# Patient Record
Sex: MALE | Race: WHITE | NOT HISPANIC OR LATINO | Employment: OTHER | URBAN - METROPOLITAN AREA
[De-identification: names, ages, dates, MRNs, and addresses within clinical notes are randomized per-mention and may not be internally consistent; named-entity substitution may affect disease eponyms.]

---

## 2021-12-23 ENCOUNTER — HOSPITAL ENCOUNTER (EMERGENCY)
Facility: CLINIC | Age: 81
Discharge: HOME | End: 2021-12-23
Attending: EMERGENCY MEDICINE | Admitting: EMERGENCY MEDICINE
Payer: COMMERCIAL

## 2021-12-23 ENCOUNTER — APPOINTMENT (OUTPATIENT)
Dept: CT IMAGING | Facility: CLINIC | Age: 81
End: 2021-12-23
Attending: EMERGENCY MEDICINE
Payer: COMMERCIAL

## 2021-12-23 ENCOUNTER — NURSE TRIAGE (OUTPATIENT)
Dept: FAMILY MEDICINE | Facility: CLINIC | Age: 81
End: 2021-12-23
Payer: COMMERCIAL

## 2021-12-23 VITALS
HEIGHT: 71 IN | DIASTOLIC BLOOD PRESSURE: 78 MMHG | TEMPERATURE: 98.7 F | HEART RATE: 57 BPM | BODY MASS INDEX: 28 KG/M2 | OXYGEN SATURATION: 99 % | SYSTOLIC BLOOD PRESSURE: 117 MMHG | RESPIRATION RATE: 15 BRPM | WEIGHT: 200 LBS

## 2021-12-23 DIAGNOSIS — T33.90XA FROSTBITE, INITIAL ENCOUNTER: ICD-10-CM

## 2021-12-23 PROCEDURE — 70450 CT HEAD/BRAIN W/O DYE: CPT

## 2021-12-23 PROCEDURE — 99284 EMERGENCY DEPT VISIT MOD MDM: CPT | Mod: 25

## 2021-12-23 RX ORDER — CEPHALEXIN 500 MG/1
500 CAPSULE ORAL 3 TIMES DAILY
Qty: 21 CAPSULE | Refills: 0 | Status: SHIPPED | OUTPATIENT
Start: 2021-12-23 | End: 2021-12-30

## 2021-12-23 RX ORDER — BACITRACIN ZINC 500 [USP'U]/G
OINTMENT TOPICAL
Qty: 30 G | Refills: 0 | Status: SHIPPED | OUTPATIENT
Start: 2021-12-23 | End: 2024-01-01

## 2021-12-23 ASSESSMENT — ENCOUNTER SYMPTOMS
DIAPHORESIS: 0
CHILLS: 0
HEADACHES: 0
COLOR CHANGE: 1
FEVER: 0
FACIAL SWELLING: 1

## 2021-12-23 ASSESSMENT — MIFFLIN-ST. JEOR: SCORE: 1634.32

## 2021-12-23 NOTE — TELEPHONE ENCOUNTER
Pt walked into the clinic c/o head injury     Fell onto hard floor at [employer] around 5am this morning  Iced head for a couple of hours     Denies any neuro symptoms   Takes a baby ASA daily     /78 97% HR 71    Large area of redness on forehead with some weeping of fluid, redness around his eye sockets. Per pt this is not normal for him     Advised pt go to ED. Pt agreeable to recommendation     Reason for Disposition    Age over 65 years with and area of head swelling or bruise    One or two 'black eyes' (bruising, purple color of eyelids), and onset within 24 hours of head injury    Additional Information    Negative: ACUTE NEUROLOGIC SYMPTOM and symptom present now    Negative: Knocked out (unconscious) > 1 minute    Negative: Seizure (convulsion) occurred (Exception: prior history of seizures and now alert and without Acute Neurologic Symptoms)    Negative: Neck pain after dangerous injury (e.g., MVA, diving, trampoline, contact sports, fall > 10 feet or 3 meters) (Exception: neck pain began > 1 hour after injury)    Negative: Major bleeding (actively dripping or spurting) that can't be stopped    Negative: Penetrating head injury (e.g., knife, gunshot wound, metal object)    Negative: Sounds like a life-threatening emergency to the triager    Negative: Recently examined and diagnosed with a concussion by a healthcare provider and has questions about concussion symptoms    Negative: Can't remember what happened (amnesia)    Negative: Vomiting once or more    Negative: Watery or blood-tinged fluid dripping from the nose or ears    Negative: ACUTE NEUROLOGIC SYMPTOM and now fine    Negative: Knocked out (unconscious) < 1 minute and now fine    Negative: Severe headache    Negative: Dangerous injury (e.g., MVA, diving, trampoline, contact sports, fall > 10 feet or 3 meters) or severe blow from hard object (e.g., golf club or baseball bat)    Negative: Large swelling or bruise > 2 inches (5 cm)    Negative:  Skin is split open or gaping (length > 1/2 inch or 12 mm)    Negative: Bleeding won't stop after 10 minutes of direct pressure (using correct technique)    Negative: Taking Coumadin (warfarin) or other strong blood thinner, or known bleeding disorder (e.g., thrombocytopenia)    Protocols used: HEAD INJURY-A-[place]

## 2021-12-23 NOTE — ED PROVIDER NOTES
"  History   Chief Complaint:  Fall     The history is provided by the patient and medical records.      Parviz Yip is a 81 year old male who is not anticoagulated with history of pericardial effusion and hypertension who presents with facial swelling and redness. The patient states he fell on Sunday, 4 days ago, at a [employer]. He states he was walking into the restaurant when he tripped and fell forward onto the ground. He does not remember hitting his face and denies loss of consciousness. Yesterday, he put some ice directly onto his forehead after he noticed some weeping on his forehead. He states he did not use a barrier to cover the ice, and placed ice directly onto his skin. He now has bilateral eye swelling, eye redness, and forehead redness. He denies any fevers, chills, diaphoresis, or headaches. He denies blood thinner use.     Review of Systems   Constitutional: Negative for chills, diaphoresis and fever.   HENT: Positive for facial swelling.    Eyes:        + swelling, redness   Skin: Positive for color change (face).   Neurological: Negative for syncope and headaches.   All other systems reviewed and are negative.    Allergies:  Amoxicillin  Lisinopril    Medications:  Metformin  Simvastatin  Diovan   ASA    Past Medical History:     Pericardial effusion  Dental abscess  Bladder stones  Prostatic hypertrophy  Hypertension    Diabetes mellitus     Past Surgical History:    Adenoidectomy  Tonsillectomy  Prostate surgery  Thoracotomy with pericardial window      Social History:  The patient was unaccompanied to the emergency department.    Physical Exam     Patient Vitals for the past 24 hrs:   BP Temp Temp src Pulse Resp SpO2 Height Weight   12/23/21 1012 (!) 145/71 -- -- 90 15 94 % -- --   12/23/21 1011 -- 98.7  F (37.1  C) Oral -- -- -- 1.803 m (5' 11\") 90.7 kg (200 lb)     Physical Exam  General: Resting comfortably on the gurney  Head:  The patient has erythema to the forehead including some " crusted over vesicles. There is no definite cellulitis. This has the appearance of cold induced injury. There is erythema to the infraorbital regions bilaterally consistent with 1st degree frostbite. The eyelids have edema noted bilaterally involving the upper and lower lids, again consistent with cold induced injury, to a lesser degree secondary to the ice packs mainly contacting the forehead and the infraorbital bones.   Eyes:  The pupils are equal, round, and reactive to light    There is no nystagmus    Extraocular muscles are intact    Conjunctivae and sclerae are normal  ENT:    The nose is normal    Pinnae are normal    The oropharynx is normal    Uvula is in the midline  Neck:  Normal range of motion    There is no rigidity noted    There is no midline cervical spine pain/tenderness    Trachea is in the midline    No mass is detected  CV:  Regular rate and underlying rhythm     Normal S1/S2, no S3/S4    No pathological murmur detected  Resp:  Lungs are clear    There is no tachypnea    Non-labored    No rales    No wheezing   Skin:  As noted above.  Neuro:  Speech is normal and fluent  Psych: Awake. Alert.      Normal affect.  Appropriate interactions.    Emergency Department Course   Imaging:  Head CT w/o contrast   Final Result   IMPRESSION:   1. Extracalvarial soft tissue swelling in the anterior forehead and   facial region. No evidence for any underlying fracture or any   intracranial hemorrhage.   2. Cerebral atrophy with associated white matter areas of decreased   attenuation. The latter most likely due to chronic small vessel   ischemic disease.       GO PATRICK MD            SYSTEM ID:  [id number]      Report per radiology    Emergency Department Course:  Reviewed:  I reviewed nursing notes and vitals    Assessments:  1034 I obtained history and examined the patient as noted above.   1155 I rechecked the patient and explained findings.     Disposition:  The patient was discharged to home.      Impression & Plan   Medical Decision Making:  Mr. 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  He suffered an injury to his face during a fall several days ago.  He started placing ice packs to the forehead, periorbital, and upper maxillary area to help with some mild discomfort.  He then noticed that he had weeping coming from the forehead and erythema to the forehead and to the maxilla.  He also noticed edema to his eyes.  He went to urgent care and they referred him to the emergency department.  The patient has obvious cold-induced injury to the skin including second-degree frostbite to the forehead with crusted over vesicles bilaterally, edema to the eyelids, and erythema without vesicles to the maxilla.  He was advised not to use ice to the face in the future.  He did not place a barrier between the ice and the skin.  There is no evidence of secondary cellulitis at this time.  The patient will be treated with topical antibiotic ointment to the forehead and an empiric antibiotic orally given that he is diabetic and there is a crusty appearance to the forehead vesicles.  There is no definite cellulitis or abscess.  This is all likely cold-induced injury.    Diagnosis:    ICD-10-CM    1. Frostbite, initial encounter  T33.90XA      Discharge Medications:  New Prescriptions    BACITRACIN 500 UNIT/GM EXTERNAL OINTMENT    Apply a thin amount to your forehead 3 times a day for the next 5 to 7 days.  Avoid getting this into your eyes.    CEPHALEXIN (KEFLEX) 500 MG CAPSULE    Take 1 capsule (500 mg) by mouth 3 times daily for 7 days     Scribe Disclosure:  MERRITT, Mae Pena, am serving as a scribe at 10:14 AM on 12/23/2021 to document services personally performed by Jabari De Jesus MD based on my observations and the provider's statements to me.     Jabari De Jesus MD  12/23/21 [id number]

## 2021-12-23 NOTE — ED TRIAGE NOTES
Fell on Sunday face forward in [employer] [address]. Patient reports hitting head. Denies LOC. Patient does not take blood thinners. Patient has bi lateral eye swelling, redness and redness and weeping to forehead.

## 2024-01-01 ENCOUNTER — PATIENT OUTREACH (OUTPATIENT)
Dept: CARE COORDINATION | Facility: CLINIC | Age: 84
End: 2024-01-01
Payer: COMMERCIAL

## 2024-01-01 ENCOUNTER — TELEPHONE (OUTPATIENT)
Dept: CARDIOLOGY | Facility: CLINIC | Age: 84
End: 2024-01-01
Payer: COMMERCIAL

## 2024-01-01 ENCOUNTER — APPOINTMENT (OUTPATIENT)
Dept: CT IMAGING | Facility: CLINIC | Age: 84
End: 2024-01-01
Attending: EMERGENCY MEDICINE
Payer: COMMERCIAL

## 2024-01-01 ENCOUNTER — LAB REQUISITION (OUTPATIENT)
Dept: LAB | Facility: CLINIC | Age: 84
End: 2024-01-01

## 2024-01-01 ENCOUNTER — LAB REQUISITION (OUTPATIENT)
Dept: LAB | Facility: CLINIC | Age: 84
End: 2024-01-01
Payer: OTHER MISCELLANEOUS

## 2024-01-01 ENCOUNTER — HOSPITAL ENCOUNTER (EMERGENCY)
Facility: CLINIC | Age: 84
Discharge: HOME | End: 2024-03-16
Attending: EMERGENCY MEDICINE | Admitting: EMERGENCY MEDICINE
Payer: COMMERCIAL

## 2024-01-01 ENCOUNTER — HOSPITAL ENCOUNTER (EMERGENCY)
Facility: CLINIC | Age: 84
Discharge: HOME | End: 2024-04-21
Attending: EMERGENCY MEDICINE | Admitting: EMERGENCY MEDICINE
Payer: COMMERCIAL

## 2024-01-01 ENCOUNTER — APPOINTMENT (OUTPATIENT)
Dept: OCCUPATIONAL THERAPY | Facility: CLINIC | Age: 84
DRG: 565 | End: 2024-01-01
Payer: COMMERCIAL

## 2024-01-01 ENCOUNTER — APPOINTMENT (OUTPATIENT)
Dept: PHYSICAL THERAPY | Facility: CLINIC | Age: 84
DRG: 565 | End: 2024-01-01
Payer: COMMERCIAL

## 2024-01-01 ENCOUNTER — APPOINTMENT (OUTPATIENT)
Dept: GENERAL RADIOLOGY | Facility: CLINIC | Age: 84
DRG: 565 | End: 2024-01-01
Attending: EMERGENCY MEDICINE
Payer: COMMERCIAL

## 2024-01-01 ENCOUNTER — APPOINTMENT (OUTPATIENT)
Dept: CT IMAGING | Facility: CLINIC | Age: 84
DRG: 565 | End: 2024-01-01
Attending: EMERGENCY MEDICINE
Payer: COMMERCIAL

## 2024-01-01 ENCOUNTER — ALLIED HEALTH/NURSE VISIT (OUTPATIENT)
Dept: UROLOGY | Facility: CLINIC | Age: 84
End: 2024-01-01
Payer: COMMERCIAL

## 2024-01-01 ENCOUNTER — TRANSITIONAL CARE UNIT VISIT (OUTPATIENT)
Dept: GERIATRICS | Facility: CLINIC | Age: 84
End: 2024-01-01
Payer: COMMERCIAL

## 2024-01-01 ENCOUNTER — HOSPITAL ENCOUNTER (EMERGENCY)
Facility: CLINIC | Age: 84
Discharge: NURSING, REHAB OR LONG-TERM CARE | End: 2024-07-20
Attending: EMERGENCY MEDICINE | Admitting: EMERGENCY MEDICINE
Payer: COMMERCIAL

## 2024-01-01 ENCOUNTER — TELEPHONE (OUTPATIENT)
Dept: NURSING | Facility: CLINIC | Age: 84
End: 2024-01-01
Payer: COMMERCIAL

## 2024-01-01 ENCOUNTER — TELEPHONE (OUTPATIENT)
Dept: UROLOGY | Facility: CLINIC | Age: 84
End: 2024-01-01
Payer: COMMERCIAL

## 2024-01-01 ENCOUNTER — MEDICAL CORRESPONDENCE (OUTPATIENT)
Dept: HEALTH INFORMATION MANAGEMENT | Facility: CLINIC | Age: 84
End: 2024-01-01

## 2024-01-01 ENCOUNTER — APPOINTMENT (OUTPATIENT)
Dept: CARDIOLOGY | Facility: CLINIC | Age: 84
DRG: 565 | End: 2024-01-01
Attending: STUDENT IN AN ORGANIZED HEALTH CARE EDUCATION/TRAINING PROGRAM
Payer: COMMERCIAL

## 2024-01-01 ENCOUNTER — DISCHARGE SUMMARY NURSING HOME (OUTPATIENT)
Dept: GERIATRICS | Facility: CLINIC | Age: 84
End: 2024-01-01
Payer: COMMERCIAL

## 2024-01-01 ENCOUNTER — APPOINTMENT (OUTPATIENT)
Dept: MRI IMAGING | Facility: CLINIC | Age: 84
DRG: 565 | End: 2024-01-01
Attending: EMERGENCY MEDICINE
Payer: COMMERCIAL

## 2024-01-01 ENCOUNTER — DOCUMENTATION ONLY (OUTPATIENT)
Dept: GERIATRICS | Facility: CLINIC | Age: 84
End: 2024-01-01

## 2024-01-01 ENCOUNTER — APPOINTMENT (OUTPATIENT)
Dept: OCCUPATIONAL THERAPY | Facility: CLINIC | Age: 84
DRG: 565 | End: 2024-01-01
Attending: STUDENT IN AN ORGANIZED HEALTH CARE EDUCATION/TRAINING PROGRAM
Payer: COMMERCIAL

## 2024-01-01 ENCOUNTER — HOSPITAL ENCOUNTER (INPATIENT)
Facility: CLINIC | Age: 84
LOS: 4 days | Discharge: NURSING, REHAB OR LONG-TERM CARE | DRG: 565 | End: 2024-01-23
Attending: EMERGENCY MEDICINE | Admitting: STUDENT IN AN ORGANIZED HEALTH CARE EDUCATION/TRAINING PROGRAM
Payer: COMMERCIAL

## 2024-01-01 ENCOUNTER — DOCUMENTATION ONLY (OUTPATIENT)
Dept: OTHER | Facility: CLINIC | Age: 84
End: 2024-01-01
Payer: COMMERCIAL

## 2024-01-01 ENCOUNTER — HOSPITAL ENCOUNTER (OUTPATIENT)
Dept: CARDIOLOGY | Facility: CLINIC | Age: 84
Discharge: HOME | End: 2024-02-01
Attending: INTERNAL MEDICINE | Admitting: INTERNAL MEDICINE
Payer: COMMERCIAL

## 2024-01-01 ENCOUNTER — APPOINTMENT (OUTPATIENT)
Dept: ULTRASOUND IMAGING | Facility: CLINIC | Age: 84
DRG: 565 | End: 2024-01-01
Attending: STUDENT IN AN ORGANIZED HEALTH CARE EDUCATION/TRAINING PROGRAM
Payer: COMMERCIAL

## 2024-01-01 ENCOUNTER — OFFICE VISIT (OUTPATIENT)
Dept: CARDIOLOGY | Facility: CLINIC | Age: 84
End: 2024-01-01
Attending: INTERNAL MEDICINE
Payer: COMMERCIAL

## 2024-01-01 ENCOUNTER — APPOINTMENT (OUTPATIENT)
Dept: PHYSICAL THERAPY | Facility: CLINIC | Age: 84
DRG: 565 | End: 2024-01-01
Attending: STUDENT IN AN ORGANIZED HEALTH CARE EDUCATION/TRAINING PROGRAM
Payer: COMMERCIAL

## 2024-01-01 ENCOUNTER — OFFICE VISIT (OUTPATIENT)
Dept: UROLOGY | Facility: CLINIC | Age: 84
End: 2024-01-01
Payer: COMMERCIAL

## 2024-01-01 VITALS
DIASTOLIC BLOOD PRESSURE: 62 MMHG | RESPIRATION RATE: 18 BRPM | SYSTOLIC BLOOD PRESSURE: 107 MMHG | TEMPERATURE: 97.8 F | BODY MASS INDEX: 24.3 KG/M2 | WEIGHT: 173.6 LBS | HEART RATE: 61 BPM | HEIGHT: 71 IN | OXYGEN SATURATION: 98 %

## 2024-01-01 VITALS
RESPIRATION RATE: 16 BRPM | TEMPERATURE: 97 F | SYSTOLIC BLOOD PRESSURE: 127 MMHG | OXYGEN SATURATION: 99 % | HEART RATE: 78 BPM | DIASTOLIC BLOOD PRESSURE: 88 MMHG

## 2024-01-01 VITALS
RESPIRATION RATE: 18 BRPM | DIASTOLIC BLOOD PRESSURE: 64 MMHG | HEIGHT: 71 IN | TEMPERATURE: 97.7 F | BODY MASS INDEX: 24.23 KG/M2 | HEART RATE: 69 BPM | SYSTOLIC BLOOD PRESSURE: 113 MMHG | OXYGEN SATURATION: 98 %

## 2024-01-01 VITALS
DIASTOLIC BLOOD PRESSURE: 56 MMHG | TEMPERATURE: 97.7 F | HEART RATE: 62 BPM | WEIGHT: 172.6 LBS | BODY MASS INDEX: 24.16 KG/M2 | HEIGHT: 71 IN | OXYGEN SATURATION: 96 % | SYSTOLIC BLOOD PRESSURE: 94 MMHG | RESPIRATION RATE: 18 BRPM

## 2024-01-01 VITALS
BODY MASS INDEX: 27.2 KG/M2 | WEIGHT: 190 LBS | DIASTOLIC BLOOD PRESSURE: 74 MMHG | HEIGHT: 70 IN | SYSTOLIC BLOOD PRESSURE: 127 MMHG | HEART RATE: 83 BPM | OXYGEN SATURATION: 97 %

## 2024-01-01 VITALS
RESPIRATION RATE: 20 BRPM | BODY MASS INDEX: 25.83 KG/M2 | SYSTOLIC BLOOD PRESSURE: 125 MMHG | TEMPERATURE: 97.2 F | OXYGEN SATURATION: 99 % | WEIGHT: 180 LBS | HEART RATE: 79 BPM | DIASTOLIC BLOOD PRESSURE: 82 MMHG

## 2024-01-01 VITALS
BODY MASS INDEX: 25.76 KG/M2 | WEIGHT: 179.5 LBS | SYSTOLIC BLOOD PRESSURE: 131 MMHG | OXYGEN SATURATION: 98 % | DIASTOLIC BLOOD PRESSURE: 76 MMHG | HEART RATE: 73 BPM

## 2024-01-01 VITALS
DIASTOLIC BLOOD PRESSURE: 69 MMHG | SYSTOLIC BLOOD PRESSURE: 116 MMHG | WEIGHT: 172.4 LBS | HEIGHT: 71 IN | OXYGEN SATURATION: 99 % | TEMPERATURE: 97.2 F | HEART RATE: 68 BPM | BODY MASS INDEX: 24.14 KG/M2 | RESPIRATION RATE: 16 BRPM

## 2024-01-01 VITALS
OXYGEN SATURATION: 96 % | BODY MASS INDEX: 24.32 KG/M2 | HEART RATE: 62 BPM | TEMPERATURE: 97.9 F | WEIGHT: 173.72 LBS | SYSTOLIC BLOOD PRESSURE: 97 MMHG | HEIGHT: 71 IN | RESPIRATION RATE: 18 BRPM | DIASTOLIC BLOOD PRESSURE: 57 MMHG

## 2024-01-01 VITALS
DIASTOLIC BLOOD PRESSURE: 74 MMHG | HEART RATE: 72 BPM | TEMPERATURE: 98.2 F | RESPIRATION RATE: 16 BRPM | OXYGEN SATURATION: 98 % | SYSTOLIC BLOOD PRESSURE: 136 MMHG

## 2024-01-01 DIAGNOSIS — T79.6XXA TRAUMATIC RHABDOMYOLYSIS, INITIAL ENCOUNTER (H): Primary | ICD-10-CM

## 2024-01-01 DIAGNOSIS — R55 SYNCOPE, UNSPECIFIED SYNCOPE TYPE: Primary | ICD-10-CM

## 2024-01-01 DIAGNOSIS — Z46.6 URINARY CATHETER (FOLEY) CHANGE REQUIRED: ICD-10-CM

## 2024-01-01 DIAGNOSIS — R29.6 FALLS FREQUENTLY: ICD-10-CM

## 2024-01-01 DIAGNOSIS — I10 ESSENTIAL HYPERTENSION: ICD-10-CM

## 2024-01-01 DIAGNOSIS — N39.0 URINARY TRACT INFECTION ASSOCIATED WITH INDWELLING URETHRAL CATHETER, INITIAL ENCOUNTER (H): ICD-10-CM

## 2024-01-01 DIAGNOSIS — M62.82 RHABDOMYOLYSIS: ICD-10-CM

## 2024-01-01 DIAGNOSIS — E78.5 DYSLIPIDEMIA: ICD-10-CM

## 2024-01-01 DIAGNOSIS — D64.9 ANEMIA, UNSPECIFIED TYPE: ICD-10-CM

## 2024-01-01 DIAGNOSIS — N13.9 OBSTRUCTION TO URINARY OUTFLOW: ICD-10-CM

## 2024-01-01 DIAGNOSIS — Z74.09 IMPAIRED MOBILITY AND ACTIVITIES OF DAILY LIVING: ICD-10-CM

## 2024-01-01 DIAGNOSIS — J45.909 UNCOMPLICATED ASTHMA, UNSPECIFIED ASTHMA SEVERITY, UNSPECIFIED WHETHER PERSISTENT: ICD-10-CM

## 2024-01-01 DIAGNOSIS — Z11.1 ENCOUNTER FOR SCREENING FOR RESPIRATORY TUBERCULOSIS: ICD-10-CM

## 2024-01-01 DIAGNOSIS — R53.1 GENERALIZED WEAKNESS: ICD-10-CM

## 2024-01-01 DIAGNOSIS — S22.080A WEDGE COMPRESSION FRACTURE OF T11-T12 VERTEBRA, INITIAL ENCOUNTER FOR CLOSED FRACTURE (H): ICD-10-CM

## 2024-01-01 DIAGNOSIS — I48.91 ATRIAL FIBRILLATION, UNSPECIFIED TYPE (H): ICD-10-CM

## 2024-01-01 DIAGNOSIS — R31.9 HEMATURIA, UNSPECIFIED TYPE: ICD-10-CM

## 2024-01-01 DIAGNOSIS — Z79.2 PROPHYLACTIC ANTIBIOTIC: Primary | ICD-10-CM

## 2024-01-01 DIAGNOSIS — Z78.9 IMPAIRED MOBILITY AND ACTIVITIES OF DAILY LIVING: ICD-10-CM

## 2024-01-01 DIAGNOSIS — R29.6 UNWITNESSED FALL: Primary | ICD-10-CM

## 2024-01-01 DIAGNOSIS — M79.10 MYALGIA, UNSPECIFIED SITE: ICD-10-CM

## 2024-01-01 DIAGNOSIS — E78.5 HYPERLIPIDEMIA, UNSPECIFIED: ICD-10-CM

## 2024-01-01 DIAGNOSIS — R33.9 URINARY RETENTION: Primary | ICD-10-CM

## 2024-01-01 DIAGNOSIS — T79.6XXA TRAUMATIC RHABDOMYOLYSIS, INITIAL ENCOUNTER (H): ICD-10-CM

## 2024-01-01 DIAGNOSIS — Z79.2 PROPHYLACTIC ANTIBIOTIC: ICD-10-CM

## 2024-01-01 DIAGNOSIS — Z97.8 CHRONIC INDWELLING FOLEY CATHETER: ICD-10-CM

## 2024-01-01 DIAGNOSIS — S22.080D COMPRESSION FRACTURE OF T12 VERTEBRA WITH ROUTINE HEALING, SUBSEQUENT ENCOUNTER: ICD-10-CM

## 2024-01-01 DIAGNOSIS — R33.8 BENIGN PROSTATIC HYPERPLASIA WITH URINARY RETENTION: ICD-10-CM

## 2024-01-01 DIAGNOSIS — E27.9 ADRENAL NODULE (H): ICD-10-CM

## 2024-01-01 DIAGNOSIS — I10 BENIGN ESSENTIAL HYPERTENSION: ICD-10-CM

## 2024-01-01 DIAGNOSIS — D64.9 ACUTE ANEMIA: ICD-10-CM

## 2024-01-01 DIAGNOSIS — D64.9 ANEMIA, UNSPECIFIED: ICD-10-CM

## 2024-01-01 DIAGNOSIS — N13.9 OBSTRUCTION TO URINARY OUTFLOW: Primary | ICD-10-CM

## 2024-01-01 DIAGNOSIS — R55 SYNCOPE, UNSPECIFIED SYNCOPE TYPE: ICD-10-CM

## 2024-01-01 DIAGNOSIS — R33.9 URINARY RETENTION: ICD-10-CM

## 2024-01-01 DIAGNOSIS — R53.81 PHYSICAL DECONDITIONING: ICD-10-CM

## 2024-01-01 DIAGNOSIS — N40.1 BENIGN PROSTATIC HYPERPLASIA WITH URINARY RETENTION: ICD-10-CM

## 2024-01-01 DIAGNOSIS — E11.9 TYPE 2 DIABETES MELLITUS WITHOUT COMPLICATION, WITHOUT LONG-TERM CURRENT USE OF INSULIN (H): ICD-10-CM

## 2024-01-01 DIAGNOSIS — Z86.59 HISTORY OF DEMENTIA: ICD-10-CM

## 2024-01-01 DIAGNOSIS — R31.0 GROSS HEMATURIA: ICD-10-CM

## 2024-01-01 DIAGNOSIS — E83.42 HYPOMAGNESEMIA: ICD-10-CM

## 2024-01-01 DIAGNOSIS — T79.6XXD TRAUMATIC RHABDOMYOLYSIS, SUBSEQUENT ENCOUNTER: ICD-10-CM

## 2024-01-01 DIAGNOSIS — G11.9 HEREDITARY ATAXIA, UNSPECIFIED (H): ICD-10-CM

## 2024-01-01 DIAGNOSIS — I48.91 ATRIAL FIBRILLATION, UNSPECIFIED TYPE (H): Primary | ICD-10-CM

## 2024-01-01 DIAGNOSIS — T83.511A URINARY TRACT INFECTION ASSOCIATED WITH INDWELLING URETHRAL CATHETER, INITIAL ENCOUNTER (H): ICD-10-CM

## 2024-01-01 DIAGNOSIS — E78.5 HYPERLIPIDEMIA LDL GOAL <100: ICD-10-CM

## 2024-01-01 DIAGNOSIS — E03.9 HYPOTHYROIDISM, UNSPECIFIED: ICD-10-CM

## 2024-01-01 LAB
ALBUMIN SERPL BCG-MCNC: 3.8 G/DL (ref 3.5–5.2)
ALBUMIN SERPL BCG-MCNC: 4.1 G/DL (ref 3.5–5.2)
ALBUMIN SERPL BCG-MCNC: 4.1 G/DL (ref 3.5–5.2)
ALBUMIN UR-MCNC: 20 MG/DL
ALBUMIN UR-MCNC: 70 MG/DL
ALBUMIN UR-MCNC: ABNORMAL G/DL
ALP SERPL-CCNC: 65 U/L (ref 40–150)
ALP SERPL-CCNC: 77 U/L (ref 40–150)
ALP SERPL-CCNC: 93 U/L (ref 40–150)
ALT SERPL W P-5'-P-CCNC: 13 U/L (ref 0–70)
ALT SERPL W P-5'-P-CCNC: 15 U/L (ref 0–70)
ALT SERPL W P-5'-P-CCNC: 16 U/L (ref 0–70)
ANION GAP SERPL CALCULATED.3IONS-SCNC: 10 MMOL/L (ref 7–15)
ANION GAP SERPL CALCULATED.3IONS-SCNC: 11 MMOL/L (ref 7–15)
ANION GAP SERPL CALCULATED.3IONS-SCNC: 11 MMOL/L (ref 7–15)
ANION GAP SERPL CALCULATED.3IONS-SCNC: 13 MMOL/L (ref 7–15)
ANION GAP SERPL CALCULATED.3IONS-SCNC: 6 MMOL/L (ref 7–15)
ANION GAP SERPL CALCULATED.3IONS-SCNC: 6 MMOL/L (ref 7–15)
ANION GAP SERPL CALCULATED.3IONS-SCNC: 7 MMOL/L (ref 7–15)
ANION GAP SERPL CALCULATED.3IONS-SCNC: 7 MMOL/L (ref 7–15)
APPEARANCE UR: ABNORMAL
AST SERPL W P-5'-P-CCNC: 19 U/L (ref 0–45)
AST SERPL W P-5'-P-CCNC: 31 U/L (ref 0–45)
AST SERPL W P-5'-P-CCNC: 33 U/L (ref 0–45)
ATRIAL RATE - MUSE: 227 BPM
ATRIAL RATE - MUSE: 68 BPM
ATRIAL RATE - MUSE: 92 BPM
ATRIAL RATE - MUSE: NORMAL BPM
BACTERIA #/AREA URNS HPF: ABNORMAL /HPF
BACTERIA UR CULT: ABNORMAL
BACTERIA UR CULT: NORMAL
BASOPHILS # BLD AUTO: 0 10E3/UL (ref 0–0.2)
BASOPHILS # BLD AUTO: 0.1 10E3/UL (ref 0–0.2)
BASOPHILS # BLD AUTO: 0.1 10E3/UL (ref 0–0.2)
BASOPHILS NFR BLD AUTO: 0 %
BASOPHILS NFR BLD AUTO: 1 %
BASOPHILS NFR BLD AUTO: 2 %
BILIRUB SERPL-MCNC: 0.2 MG/DL
BILIRUB SERPL-MCNC: 0.5 MG/DL
BILIRUB SERPL-MCNC: 0.6 MG/DL
BILIRUB UR QL STRIP: ABNORMAL
BILIRUB UR QL STRIP: NEGATIVE
BILIRUB UR QL STRIP: NEGATIVE
BUN SERPL-MCNC: 20.1 MG/DL (ref 8–23)
BUN SERPL-MCNC: 22.6 MG/DL (ref 8–23)
BUN SERPL-MCNC: 24.1 MG/DL (ref 8–23)
BUN SERPL-MCNC: 24.8 MG/DL (ref 8–23)
BUN SERPL-MCNC: 25.2 MG/DL (ref 8–23)
BUN SERPL-MCNC: 25.6 MG/DL (ref 8–23)
BUN SERPL-MCNC: 25.9 MG/DL (ref 8–23)
BUN SERPL-MCNC: 35.5 MG/DL (ref 8–23)
CALCIUM SERPL-MCNC: 8.1 MG/DL (ref 8.8–10.2)
CALCIUM SERPL-MCNC: 8.2 MG/DL (ref 8.8–10.2)
CALCIUM SERPL-MCNC: 8.4 MG/DL (ref 8.8–10.2)
CALCIUM SERPL-MCNC: 8.9 MG/DL (ref 8.8–10.2)
CALCIUM SERPL-MCNC: 9.3 MG/DL (ref 8.8–10.2)
CALCIUM SERPL-MCNC: 9.3 MG/DL (ref 8.8–10.4)
CALCIUM SERPL-MCNC: 9.3 MG/DL (ref 8.8–10.4)
CALCIUM SERPL-MCNC: 9.5 MG/DL (ref 8.8–10.2)
CHLORIDE SERPL-SCNC: 100 MMOL/L (ref 98–107)
CHLORIDE SERPL-SCNC: 103 MMOL/L (ref 98–107)
CHLORIDE SERPL-SCNC: 103 MMOL/L (ref 98–107)
CHLORIDE SERPL-SCNC: 104 MMOL/L (ref 98–107)
CHLORIDE SERPL-SCNC: 104 MMOL/L (ref 98–107)
CHLORIDE SERPL-SCNC: 106 MMOL/L (ref 98–107)
CHLORIDE SERPL-SCNC: 106 MMOL/L (ref 98–107)
CHLORIDE SERPL-SCNC: 109 MMOL/L (ref 98–107)
CHOLEST SERPL-MCNC: 136 MG/DL
CK SERPL-CCNC: 109 U/L (ref 39–308)
CK SERPL-CCNC: 379 U/L (ref 39–308)
CK SERPL-CCNC: 629 U/L (ref 39–308)
CK SERPL-CCNC: 708 U/L (ref 39–308)
CK SERPL-CCNC: 755 U/L (ref 39–308)
COLOR UR AUTO: ABNORMAL
COLOR UR AUTO: ABNORMAL
COLOR UR AUTO: YELLOW
CREAT SERPL-MCNC: 0.91 MG/DL (ref 0.67–1.17)
CREAT SERPL-MCNC: 0.93 MG/DL (ref 0.67–1.17)
CREAT SERPL-MCNC: 0.96 MG/DL (ref 0.67–1.17)
CREAT SERPL-MCNC: 0.97 MG/DL (ref 0.67–1.17)
CREAT SERPL-MCNC: 0.98 MG/DL (ref 0.67–1.17)
CREAT SERPL-MCNC: 1.02 MG/DL (ref 0.67–1.17)
CREAT SERPL-MCNC: 1.14 MG/DL (ref 0.67–1.17)
CREAT SERPL-MCNC: 1.23 MG/DL (ref 0.67–1.17)
DEPRECATED HCO3 PLAS-SCNC: 24 MMOL/L (ref 22–29)
DEPRECATED HCO3 PLAS-SCNC: 26 MMOL/L (ref 22–29)
DEPRECATED HCO3 PLAS-SCNC: 27 MMOL/L (ref 22–29)
DEPRECATED HCO3 PLAS-SCNC: 28 MMOL/L (ref 22–29)
DIASTOLIC BLOOD PRESSURE - MUSE: NORMAL MMHG
EGFRCR SERPLBLD CKD-EPI 2021: 58 ML/MIN/1.73M2
EGFRCR SERPLBLD CKD-EPI 2021: 64 ML/MIN/1.73M2
EGFRCR SERPLBLD CKD-EPI 2021: 72 ML/MIN/1.73M2
EGFRCR SERPLBLD CKD-EPI 2021: 77 ML/MIN/1.73M2
EGFRCR SERPLBLD CKD-EPI 2021: 77 ML/MIN/1.73M2
EGFRCR SERPLBLD CKD-EPI 2021: 78 ML/MIN/1.73M2
EGFRCR SERPLBLD CKD-EPI 2021: 81 ML/MIN/1.73M2
EGFRCR SERPLBLD CKD-EPI 2021: 84 ML/MIN/1.73M2
EOSINOPHIL # BLD AUTO: 0 10E3/UL (ref 0–0.7)
EOSINOPHIL # BLD AUTO: 0.4 10E3/UL (ref 0–0.7)
EOSINOPHIL # BLD AUTO: 0.5 10E3/UL (ref 0–0.7)
EOSINOPHIL NFR BLD AUTO: 0 %
EOSINOPHIL NFR BLD AUTO: 5 %
EOSINOPHIL NFR BLD AUTO: 6 %
ERYTHROCYTE [DISTWIDTH] IN BLOOD BY AUTOMATED COUNT: 13.4 % (ref 10–15)
ERYTHROCYTE [DISTWIDTH] IN BLOOD BY AUTOMATED COUNT: 13.5 % (ref 10–15)
ERYTHROCYTE [DISTWIDTH] IN BLOOD BY AUTOMATED COUNT: 13.6 % (ref 10–15)
ERYTHROCYTE [DISTWIDTH] IN BLOOD BY AUTOMATED COUNT: 13.9 % (ref 10–15)
ERYTHROCYTE [DISTWIDTH] IN BLOOD BY AUTOMATED COUNT: 14.2 % (ref 10–15)
ERYTHROCYTE [DISTWIDTH] IN BLOOD BY AUTOMATED COUNT: 14.6 % (ref 10–15)
ERYTHROCYTE [DISTWIDTH] IN BLOOD BY AUTOMATED COUNT: 15.5 % (ref 10–15)
ERYTHROCYTE [DISTWIDTH] IN BLOOD BY AUTOMATED COUNT: 15.5 % (ref 10–15)
FASTING STATUS PATIENT QL REPORTED: NO
FLUAV RNA SPEC QL NAA+PROBE: NEGATIVE
FLUBV RNA RESP QL NAA+PROBE: NEGATIVE
GAMMA INTERFERON BACKGROUND BLD IA-ACNC: 0.05 IU/ML
GLUCOSE BLDC GLUCOMTR-MCNC: 100 MG/DL (ref 70–99)
GLUCOSE BLDC GLUCOMTR-MCNC: 101 MG/DL (ref 70–99)
GLUCOSE BLDC GLUCOMTR-MCNC: 103 MG/DL (ref 70–99)
GLUCOSE BLDC GLUCOMTR-MCNC: 109 MG/DL (ref 70–99)
GLUCOSE BLDC GLUCOMTR-MCNC: 109 MG/DL (ref 70–99)
GLUCOSE BLDC GLUCOMTR-MCNC: 110 MG/DL (ref 70–99)
GLUCOSE BLDC GLUCOMTR-MCNC: 113 MG/DL (ref 70–99)
GLUCOSE BLDC GLUCOMTR-MCNC: 115 MG/DL (ref 70–99)
GLUCOSE BLDC GLUCOMTR-MCNC: 119 MG/DL (ref 70–99)
GLUCOSE BLDC GLUCOMTR-MCNC: 131 MG/DL (ref 70–99)
GLUCOSE BLDC GLUCOMTR-MCNC: 134 MG/DL (ref 70–99)
GLUCOSE BLDC GLUCOMTR-MCNC: 93 MG/DL (ref 70–99)
GLUCOSE BLDC GLUCOMTR-MCNC: 93 MG/DL (ref 70–99)
GLUCOSE SERPL-MCNC: 101 MG/DL (ref 70–99)
GLUCOSE SERPL-MCNC: 106 MG/DL (ref 70–99)
GLUCOSE SERPL-MCNC: 108 MG/DL (ref 70–99)
GLUCOSE SERPL-MCNC: 112 MG/DL (ref 70–99)
GLUCOSE SERPL-MCNC: 125 MG/DL (ref 70–99)
GLUCOSE SERPL-MCNC: 126 MG/DL (ref 70–99)
GLUCOSE SERPL-MCNC: 85 MG/DL (ref 70–99)
GLUCOSE SERPL-MCNC: 98 MG/DL (ref 70–99)
GLUCOSE UR STRIP-MCNC: ABNORMAL MG/DL
GLUCOSE UR STRIP-MCNC: NEGATIVE MG/DL
GLUCOSE UR STRIP-MCNC: NEGATIVE MG/DL
HBA1C MFR BLD: 5.8 %
HBA1C MFR BLD: 5.9 %
HCO3 BLDV-SCNC: 26 MMOL/L (ref 21–28)
HCO3 SERPL-SCNC: 23 MMOL/L (ref 22–29)
HCO3 SERPL-SCNC: 27 MMOL/L (ref 22–29)
HCT VFR BLD AUTO: 29 % (ref 40–53)
HCT VFR BLD AUTO: 29 % (ref 40–53)
HCT VFR BLD AUTO: 33 % (ref 40–53)
HCT VFR BLD AUTO: 35 % (ref 40–53)
HCT VFR BLD AUTO: 36 % (ref 40–53)
HCT VFR BLD AUTO: 37.8 % (ref 40–53)
HCT VFR BLD AUTO: 38.7 % (ref 40–53)
HCT VFR BLD AUTO: 42.9 % (ref 40–53)
HDLC SERPL-MCNC: 37 MG/DL
HGB BLD-MCNC: 10.9 G/DL (ref 13.3–17.7)
HGB BLD-MCNC: 11.1 G/DL (ref 13.3–17.7)
HGB BLD-MCNC: 11.5 G/DL (ref 13.3–17.7)
HGB BLD-MCNC: 11.9 G/DL (ref 13.3–17.7)
HGB BLD-MCNC: 12.4 G/DL (ref 13.3–17.7)
HGB BLD-MCNC: 13 G/DL (ref 13.3–17.7)
HGB BLD-MCNC: 13.4 G/DL (ref 13.3–17.7)
HGB BLD-MCNC: 9.7 G/DL (ref 13.3–17.7)
HGB BLD-MCNC: 9.8 G/DL (ref 13.3–17.7)
HGB UR QL STRIP: ABNORMAL
HOLD SPECIMEN: NORMAL
IMM GRANULOCYTES # BLD: 0 10E3/UL
IMM GRANULOCYTES NFR BLD: 0 %
IMM GRANULOCYTES NFR BLD: 0 %
IMM GRANULOCYTES NFR BLD: 1 %
INTERPRETATION ECG - MUSE: NORMAL
KETONES UR STRIP-MCNC: ABNORMAL MG/DL
KETONES UR STRIP-MCNC: NEGATIVE MG/DL
KETONES UR STRIP-MCNC: NEGATIVE MG/DL
LACTATE BLD-SCNC: 1.7 MMOL/L
LACTATE SERPL-SCNC: 2.4 MMOL/L (ref 0.7–2)
LDLC SERPL CALC-MCNC: 77 MG/DL
LEUKOCYTE ESTERASE UR QL STRIP: ABNORMAL
LEUKOCYTE ESTERASE UR QL STRIP: ABNORMAL
LEUKOCYTE ESTERASE UR QL STRIP: NEGATIVE
LVEF ECHO: NORMAL
LYMPHOCYTES # BLD AUTO: 0.3 10E3/UL (ref 0.8–5.3)
LYMPHOCYTES # BLD AUTO: 0.6 10E3/UL (ref 0.8–5.3)
LYMPHOCYTES # BLD AUTO: 1 10E3/UL (ref 0.8–5.3)
LYMPHOCYTES NFR BLD AUTO: 15 %
LYMPHOCYTES NFR BLD AUTO: 3 %
LYMPHOCYTES NFR BLD AUTO: 7 %
M TB IFN-G BLD-IMP: NEGATIVE
M TB IFN-G CD4+ BCKGRND COR BLD-ACNC: 9.95 IU/ML
MAGNESIUM SERPL-MCNC: 1.6 MG/DL (ref 1.7–2.3)
MAGNESIUM SERPL-MCNC: 1.7 MG/DL (ref 1.7–2.3)
MAGNESIUM SERPL-MCNC: 1.8 MG/DL (ref 1.7–2.3)
MAGNESIUM SERPL-MCNC: 2.1 MG/DL (ref 1.7–2.3)
MCH RBC QN AUTO: 28.8 PG (ref 26.5–33)
MCH RBC QN AUTO: 29.3 PG (ref 26.5–33)
MCH RBC QN AUTO: 29.5 PG (ref 26.5–33)
MCH RBC QN AUTO: 29.7 PG (ref 26.5–33)
MCH RBC QN AUTO: 29.8 PG (ref 26.5–33)
MCH RBC QN AUTO: 29.9 PG (ref 26.5–33)
MCH RBC QN AUTO: 30.2 PG (ref 26.5–33)
MCH RBC QN AUTO: 30.4 PG (ref 26.5–33)
MCHC RBC AUTO-ENTMCNC: 31.2 G/DL (ref 31.5–36.5)
MCHC RBC AUTO-ENTMCNC: 31.7 G/DL (ref 31.5–36.5)
MCHC RBC AUTO-ENTMCNC: 31.9 G/DL (ref 31.5–36.5)
MCHC RBC AUTO-ENTMCNC: 32.8 G/DL (ref 31.5–36.5)
MCHC RBC AUTO-ENTMCNC: 33 G/DL (ref 31.5–36.5)
MCHC RBC AUTO-ENTMCNC: 33.4 G/DL (ref 31.5–36.5)
MCHC RBC AUTO-ENTMCNC: 33.6 G/DL (ref 31.5–36.5)
MCHC RBC AUTO-ENTMCNC: 33.8 G/DL (ref 31.5–36.5)
MCV RBC AUTO: 90 FL (ref 78–100)
MCV RBC AUTO: 91 FL (ref 78–100)
MCV RBC AUTO: 92 FL (ref 78–100)
MCV RBC AUTO: 92 FL (ref 78–100)
MCV RBC AUTO: 93 FL (ref 78–100)
MITOGEN IGNF BCKGRD COR BLD-ACNC: 0 IU/ML
MITOGEN IGNF BCKGRD COR BLD-ACNC: 0.07 IU/ML
MONOCYTES # BLD AUTO: 0.7 10E3/UL (ref 0–1.3)
MONOCYTES # BLD AUTO: 0.8 10E3/UL (ref 0–1.3)
MONOCYTES # BLD AUTO: 0.8 10E3/UL (ref 0–1.3)
MONOCYTES NFR BLD AUTO: 12 %
MONOCYTES NFR BLD AUTO: 7 %
MONOCYTES NFR BLD AUTO: 9 %
MUCOUS THREADS #/AREA URNS LPF: PRESENT /LPF
MUCOUS THREADS #/AREA URNS LPF: PRESENT /LPF
NEUTROPHILS # BLD AUTO: 4.4 10E3/UL (ref 1.6–8.3)
NEUTROPHILS # BLD AUTO: 6.8 10E3/UL (ref 1.6–8.3)
NEUTROPHILS # BLD AUTO: 8.5 10E3/UL (ref 1.6–8.3)
NEUTROPHILS NFR BLD AUTO: 64 %
NEUTROPHILS NFR BLD AUTO: 79 %
NEUTROPHILS NFR BLD AUTO: 90 %
NITRATE UR QL: ABNORMAL
NITRATE UR QL: NEGATIVE
NITRATE UR QL: NEGATIVE
NONHDLC SERPL-MCNC: 99 MG/DL
NRBC # BLD AUTO: 0 10E3/UL
NRBC BLD AUTO-RTO: 0 /100
P AXIS - MUSE: 72 DEGREES
P AXIS - MUSE: NORMAL DEGREES
PCO2 BLDV: 43 MM HG (ref 40–50)
PH BLDV: 7.39 [PH] (ref 7.32–7.43)
PH UR STRIP: 5 [PH] (ref 5–7)
PH UR STRIP: 6 [PH] (ref 5–7)
PH UR STRIP: ABNORMAL [PH]
PHOSPHATE SERPL-MCNC: 2.9 MG/DL (ref 2.5–4.5)
PLATELET # BLD AUTO: 147 10E3/UL (ref 150–450)
PLATELET # BLD AUTO: 162 10E3/UL (ref 150–450)
PLATELET # BLD AUTO: 166 10E3/UL (ref 150–450)
PLATELET # BLD AUTO: 190 10E3/UL (ref 150–450)
PLATELET # BLD AUTO: 198 10E3/UL (ref 150–450)
PLATELET # BLD AUTO: 241 10E3/UL (ref 150–450)
PLATELET # BLD AUTO: 278 10E3/UL (ref 150–450)
PLATELET # BLD AUTO: 287 10E3/UL (ref 150–450)
PO2 BLDV: 30 MM HG (ref 25–47)
POTASSIUM SERPL-SCNC: 4.1 MMOL/L (ref 3.4–5.3)
POTASSIUM SERPL-SCNC: 4.3 MMOL/L (ref 3.4–5.3)
POTASSIUM SERPL-SCNC: 4.4 MMOL/L (ref 3.4–5.3)
POTASSIUM SERPL-SCNC: 4.4 MMOL/L (ref 3.4–5.3)
POTASSIUM SERPL-SCNC: 4.6 MMOL/L (ref 3.4–5.3)
POTASSIUM SERPL-SCNC: 4.9 MMOL/L (ref 3.4–5.3)
PR INTERVAL - MUSE: 112 MS
PR INTERVAL - MUSE: NORMAL MS
PROT SERPL-MCNC: 7.1 G/DL (ref 6.4–8.3)
PROT SERPL-MCNC: 7.3 G/DL (ref 6.4–8.3)
PROT SERPL-MCNC: 7.4 G/DL (ref 6.4–8.3)
QRS DURATION - MUSE: 100 MS
QRS DURATION - MUSE: 72 MS
QRS DURATION - MUSE: 78 MS
QRS DURATION - MUSE: 86 MS
QT - MUSE: 376 MS
QT - MUSE: 380 MS
QT - MUSE: 390 MS
QT - MUSE: 406 MS
QTC - MUSE: 388 MS
QTC - MUSE: 399 MS
QTC - MUSE: 414 MS
QTC - MUSE: 425 MS
QUANTIFERON MITOGEN: 10 IU/ML
QUANTIFERON NIL TUBE: 0.05 IU/ML
QUANTIFERON TB1 TUBE: 0.12 IU/ML
QUANTIFERON TB2 TUBE: 0.05
R AXIS - MUSE: -36 DEGREES
R AXIS - MUSE: 19 DEGREES
R AXIS - MUSE: 46 DEGREES
R AXIS - MUSE: 62 DEGREES
RBC # BLD AUTO: 3.22 10E6/UL (ref 4.4–5.9)
RBC # BLD AUTO: 3.24 10E6/UL (ref 4.4–5.9)
RBC # BLD AUTO: 3.67 10E6/UL (ref 4.4–5.9)
RBC # BLD AUTO: 3.76 10E6/UL (ref 4.4–5.9)
RBC # BLD AUTO: 3.92 10E6/UL (ref 4.4–5.9)
RBC # BLD AUTO: 4.16 10E6/UL (ref 4.4–5.9)
RBC # BLD AUTO: 4.31 10E6/UL (ref 4.4–5.9)
RBC # BLD AUTO: 4.66 10E6/UL (ref 4.4–5.9)
RBC URINE: >182 /HPF
RESIDUAL VOLUME (RV) (EXTERNAL): NORMAL
RSV RNA SPEC NAA+PROBE: NEGATIVE
SAO2 % BLDV: 57 % (ref 70–75)
SARS-COV-2 RNA RESP QL NAA+PROBE: NEGATIVE
SODIUM SERPL-SCNC: 136 MMOL/L (ref 135–145)
SODIUM SERPL-SCNC: 138 MMOL/L (ref 135–145)
SODIUM SERPL-SCNC: 138 MMOL/L (ref 135–145)
SODIUM SERPL-SCNC: 139 MMOL/L (ref 135–145)
SODIUM SERPL-SCNC: 140 MMOL/L (ref 135–145)
SODIUM SERPL-SCNC: 142 MMOL/L (ref 135–145)
SP GR UR STRIP: 1.02 (ref 1–1.03)
SP GR UR STRIP: 1.02 (ref 1–1.03)
SP GR UR STRIP: ABNORMAL
SQUAMOUS EPITHELIAL: 1 /HPF
SQUAMOUS EPITHELIAL: <1 /HPF
SYSTOLIC BLOOD PRESSURE - MUSE: NORMAL MMHG
T AXIS - MUSE: 1 DEGREES
T AXIS - MUSE: 28 DEGREES
T AXIS - MUSE: 43 DEGREES
T AXIS - MUSE: 57 DEGREES
T4 FREE SERPL-MCNC: 1.2 NG/DL (ref 0.9–1.7)
TRIGL SERPL-MCNC: 112 MG/DL
TROPONIN T SERPL HS-MCNC: 26 NG/L
TROPONIN T SERPL HS-MCNC: 29 NG/L
TROPONIN T SERPL HS-MCNC: 36 NG/L
TROPONIN T SERPL HS-MCNC: 39 NG/L
TROPONIN T SERPL HS-MCNC: 39 NG/L
TSH SERPL DL<=0.005 MIU/L-ACNC: 2.74 UIU/ML (ref 0.3–4.2)
TSH SERPL DL<=0.005 MIU/L-ACNC: 3.92 UIU/ML (ref 0.3–4.2)
UFH PPP CHRO-ACNC: 0.17 IU/ML
UFH PPP CHRO-ACNC: 0.21 IU/ML
UFH PPP CHRO-ACNC: 0.27 IU/ML
UFH PPP CHRO-ACNC: 0.35 IU/ML
UFH PPP CHRO-ACNC: 0.8 IU/ML
UROBILINOGEN UR STRIP-MCNC: 2 MG/DL
UROBILINOGEN UR STRIP-MCNC: ABNORMAL MG/DL
UROBILINOGEN UR STRIP-MCNC: NORMAL MG/DL
VENTRICULAR RATE- MUSE: 63 BPM
VENTRICULAR RATE- MUSE: 66 BPM
VENTRICULAR RATE- MUSE: 68 BPM
VENTRICULAR RATE- MUSE: 68 BPM
VIT B12 SERPL-MCNC: 574 PG/ML (ref 232–1245)
VIT D+METAB SERPL-MCNC: 31 NG/ML (ref 20–50)
WBC # BLD AUTO: 5.8 10E3/UL (ref 4–11)
WBC # BLD AUTO: 5.9 10E3/UL (ref 4–11)
WBC # BLD AUTO: 5.9 10E3/UL (ref 4–11)
WBC # BLD AUTO: 6.6 10E3/UL (ref 4–11)
WBC # BLD AUTO: 6.7 10E3/UL (ref 4–11)
WBC # BLD AUTO: 8.1 10E3/UL (ref 4–11)
WBC # BLD AUTO: 8.6 10E3/UL (ref 4–11)
WBC # BLD AUTO: 9.5 10E3/UL (ref 4–11)
WBC CLUMPS #/AREA URNS HPF: PRESENT /HPF
WBC URINE: 10 /HPF
WBC URINE: 9 /HPF
WBC URINE: >182 /HPF

## 2024-01-01 PROCEDURE — 36415 COLL VENOUS BLD VENIPUNCTURE: CPT | Performed by: STUDENT IN AN ORGANIZED HEALTH CARE EDUCATION/TRAINING PROGRAM

## 2024-01-01 PROCEDURE — 96361 HYDRATE IV INFUSION ADD-ON: CPT

## 2024-01-01 PROCEDURE — 250N000011 HC RX IP 250 OP 636: Performed by: INTERNAL MEDICINE

## 2024-01-01 PROCEDURE — 80061 LIPID PANEL: CPT | Mod: ORL | Performed by: FAMILY MEDICINE

## 2024-01-01 PROCEDURE — 96365 THER/PROPH/DIAG IV INF INIT: CPT | Mod: 59

## 2024-01-01 PROCEDURE — P9604 ONE-WAY ALLOW PRORATED TRIP: HCPCS | Mod: ORL | Performed by: FAMILY MEDICINE

## 2024-01-01 PROCEDURE — 80048 BASIC METABOLIC PNL TOTAL CA: CPT | Performed by: PHYSICIAN ASSISTANT

## 2024-01-01 PROCEDURE — 83735 ASSAY OF MAGNESIUM: CPT | Performed by: PHYSICIAN ASSISTANT

## 2024-01-01 PROCEDURE — 250N000011 HC RX IP 250 OP 636: Performed by: EMERGENCY MEDICINE

## 2024-01-01 PROCEDURE — 93306 TTE W/DOPPLER COMPLETE: CPT

## 2024-01-01 PROCEDURE — 99284 EMERGENCY DEPT VISIT MOD MDM: CPT | Mod: 25

## 2024-01-01 PROCEDURE — 97161 PT EVAL LOW COMPLEX 20 MIN: CPT | Mod: GP

## 2024-01-01 PROCEDURE — 80048 BASIC METABOLIC PNL TOTAL CA: CPT | Performed by: INTERNAL MEDICINE

## 2024-01-01 PROCEDURE — 93005 ELECTROCARDIOGRAM TRACING: CPT

## 2024-01-01 PROCEDURE — 258N000003 HC RX IP 258 OP 636: Performed by: INTERNAL MEDICINE

## 2024-01-01 PROCEDURE — 97116 GAIT TRAINING THERAPY: CPT | Mod: GP

## 2024-01-01 PROCEDURE — 99213 OFFICE O/P EST LOW 20 MIN: CPT | Mod: 25 | Performed by: STUDENT IN AN ORGANIZED HEALTH CARE EDUCATION/TRAINING PROGRAM

## 2024-01-01 PROCEDURE — 80053 COMPREHEN METABOLIC PANEL: CPT | Mod: ORL | Performed by: FAMILY MEDICINE

## 2024-01-01 PROCEDURE — 99309 SBSQ NF CARE MODERATE MDM 30: CPT | Performed by: PHYSICIAN ASSISTANT

## 2024-01-01 PROCEDURE — 51726 COMPLEX CYSTOMETROGRAM: CPT | Performed by: PHYSICIAN ASSISTANT

## 2024-01-01 PROCEDURE — 258N000003 HC RX IP 258 OP 636: Performed by: STUDENT IN AN ORGANIZED HEALTH CARE EDUCATION/TRAINING PROGRAM

## 2024-01-01 PROCEDURE — 81001 URINALYSIS AUTO W/SCOPE: CPT | Performed by: EMERGENCY MEDICINE

## 2024-01-01 PROCEDURE — 99232 SBSQ HOSP IP/OBS MODERATE 35: CPT | Performed by: INTERNAL MEDICINE

## 2024-01-01 PROCEDURE — 250N000013 HC RX MED GY IP 250 OP 250 PS 637: Performed by: STUDENT IN AN ORGANIZED HEALTH CARE EDUCATION/TRAINING PROGRAM

## 2024-01-01 PROCEDURE — 93010 ELECTROCARDIOGRAM REPORT: CPT | Mod: 76 | Performed by: INTERNAL MEDICINE

## 2024-01-01 PROCEDURE — 250N000013 HC RX MED GY IP 250 OP 250 PS 637: Performed by: INTERNAL MEDICINE

## 2024-01-01 PROCEDURE — 85018 HEMOGLOBIN: CPT | Performed by: EMERGENCY MEDICINE

## 2024-01-01 PROCEDURE — 84443 ASSAY THYROID STIM HORMONE: CPT | Performed by: STUDENT IN AN ORGANIZED HEALTH CARE EDUCATION/TRAINING PROGRAM

## 2024-01-01 PROCEDURE — 83036 HEMOGLOBIN GLYCOSYLATED A1C: CPT | Performed by: INTERNAL MEDICINE

## 2024-01-01 PROCEDURE — 51702 INSERT TEMP BLADDER CATH: CPT

## 2024-01-01 PROCEDURE — 97530 THERAPEUTIC ACTIVITIES: CPT | Mod: GO

## 2024-01-01 PROCEDURE — 97110 THERAPEUTIC EXERCISES: CPT | Mod: GP

## 2024-01-01 PROCEDURE — 82306 VITAMIN D 25 HYDROXY: CPT | Mod: ORL | Performed by: FAMILY MEDICINE

## 2024-01-01 PROCEDURE — 70450 CT HEAD/BRAIN W/O DYE: CPT

## 2024-01-01 PROCEDURE — 84439 ASSAY OF FREE THYROXINE: CPT | Mod: ORL | Performed by: FAMILY MEDICINE

## 2024-01-01 PROCEDURE — 99285 EMERGENCY DEPT VISIT HI MDM: CPT | Mod: 25

## 2024-01-01 PROCEDURE — 36415 COLL VENOUS BLD VENIPUNCTURE: CPT | Performed by: EMERGENCY MEDICINE

## 2024-01-01 PROCEDURE — 70496 CT ANGIOGRAPHY HEAD: CPT

## 2024-01-01 PROCEDURE — 82040 ASSAY OF SERUM ALBUMIN: CPT | Performed by: EMERGENCY MEDICINE

## 2024-01-01 PROCEDURE — 36415 COLL VENOUS BLD VENIPUNCTURE: CPT | Performed by: PHYSICIAN ASSISTANT

## 2024-01-01 PROCEDURE — 82803 BLOOD GASES ANY COMBINATION: CPT

## 2024-01-01 PROCEDURE — 258N000003 HC RX IP 258 OP 636: Performed by: EMERGENCY MEDICINE

## 2024-01-01 PROCEDURE — 85027 COMPLETE CBC AUTOMATED: CPT | Performed by: INTERNAL MEDICINE

## 2024-01-01 PROCEDURE — 250N000011 HC RX IP 250 OP 636: Performed by: STUDENT IN AN ORGANIZED HEALTH CARE EDUCATION/TRAINING PROGRAM

## 2024-01-01 PROCEDURE — 85027 COMPLETE CBC AUTOMATED: CPT | Mod: ORL | Performed by: FAMILY MEDICINE

## 2024-01-01 PROCEDURE — 51784 ANAL/URINARY MUSCLE STUDY: CPT | Performed by: PHYSICIAN ASSISTANT

## 2024-01-01 PROCEDURE — 80048 BASIC METABOLIC PNL TOTAL CA: CPT | Performed by: STUDENT IN AN ORGANIZED HEALTH CARE EDUCATION/TRAINING PROGRAM

## 2024-01-01 PROCEDURE — 52000 CYSTOURETHROSCOPY: CPT | Performed by: STUDENT IN AN ORGANIZED HEALTH CARE EDUCATION/TRAINING PROGRAM

## 2024-01-01 PROCEDURE — 82607 VITAMIN B-12: CPT | Mod: ORL | Performed by: FAMILY MEDICINE

## 2024-01-01 PROCEDURE — 87637 SARSCOV2&INF A&B&RSV AMP PRB: CPT | Performed by: EMERGENCY MEDICINE

## 2024-01-01 PROCEDURE — P9604 ONE-WAY ALLOW PRORATED TRIP: HCPCS | Performed by: PHYSICIAN ASSISTANT

## 2024-01-01 PROCEDURE — 82550 ASSAY OF CK (CPK): CPT | Performed by: PHYSICIAN ASSISTANT

## 2024-01-01 PROCEDURE — 80053 COMPREHEN METABOLIC PANEL: CPT | Performed by: EMERGENCY MEDICINE

## 2024-01-01 PROCEDURE — 85520 HEPARIN ASSAY: CPT | Performed by: INTERNAL MEDICINE

## 2024-01-01 PROCEDURE — 99223 1ST HOSP IP/OBS HIGH 75: CPT | Performed by: STUDENT IN AN ORGANIZED HEALTH CARE EDUCATION/TRAINING PROGRAM

## 2024-01-01 PROCEDURE — 99223 1ST HOSP IP/OBS HIGH 75: CPT | Performed by: INTERNAL MEDICINE

## 2024-01-01 PROCEDURE — 74176 CT ABD & PELVIS W/O CONTRAST: CPT

## 2024-01-01 PROCEDURE — 70551 MRI BRAIN STEM W/O DYE: CPT

## 2024-01-01 PROCEDURE — 85027 COMPLETE CBC AUTOMATED: CPT | Performed by: STUDENT IN AN ORGANIZED HEALTH CARE EDUCATION/TRAINING PROGRAM

## 2024-01-01 PROCEDURE — 99283 EMERGENCY DEPT VISIT LOW MDM: CPT | Mod: 25

## 2024-01-01 PROCEDURE — 85025 COMPLETE CBC W/AUTO DIFF WBC: CPT | Performed by: EMERGENCY MEDICINE

## 2024-01-01 PROCEDURE — 96365 THER/PROPH/DIAG IV INF INIT: CPT

## 2024-01-01 PROCEDURE — 93306 TTE W/DOPPLER COMPLETE: CPT | Mod: 26 | Performed by: INTERNAL MEDICINE

## 2024-01-01 PROCEDURE — 99214 OFFICE O/P EST MOD 30 MIN: CPT | Performed by: NURSE PRACTITIONER

## 2024-01-01 PROCEDURE — 83735 ASSAY OF MAGNESIUM: CPT | Performed by: EMERGENCY MEDICINE

## 2024-01-01 PROCEDURE — 93227 XTRNL ECG REC<48 HR R&I: CPT | Performed by: INTERNAL MEDICINE

## 2024-01-01 PROCEDURE — G0463 HOSPITAL OUTPT CLINIC VISIT: HCPCS

## 2024-01-01 PROCEDURE — 84443 ASSAY THYROID STIM HORMONE: CPT | Mod: ORL | Performed by: FAMILY MEDICINE

## 2024-01-01 PROCEDURE — 85520 HEPARIN ASSAY: CPT | Performed by: STUDENT IN AN ORGANIZED HEALTH CARE EDUCATION/TRAINING PROGRAM

## 2024-01-01 PROCEDURE — 51798 US URINE CAPACITY MEASURE: CPT | Performed by: STUDENT IN AN ORGANIZED HEALTH CARE EDUCATION/TRAINING PROGRAM

## 2024-01-01 PROCEDURE — 83735 ASSAY OF MAGNESIUM: CPT | Performed by: INTERNAL MEDICINE

## 2024-01-01 PROCEDURE — 82550 ASSAY OF CK (CPK): CPT | Performed by: STUDENT IN AN ORGANIZED HEALTH CARE EDUCATION/TRAINING PROGRAM

## 2024-01-01 PROCEDURE — 86481 TB AG RESPONSE T-CELL SUSP: CPT | Performed by: PHYSICIAN ASSISTANT

## 2024-01-01 PROCEDURE — 36415 COLL VENOUS BLD VENIPUNCTURE: CPT | Mod: ORL | Performed by: FAMILY MEDICINE

## 2024-01-01 PROCEDURE — 84484 ASSAY OF TROPONIN QUANT: CPT | Performed by: STUDENT IN AN ORGANIZED HEALTH CARE EDUCATION/TRAINING PROGRAM

## 2024-01-01 PROCEDURE — 36415 COLL VENOUS BLD VENIPUNCTURE: CPT | Performed by: INTERNAL MEDICINE

## 2024-01-01 PROCEDURE — 99207 PR NO BILLABLE SERVICE THIS VISIT: CPT | Performed by: PHYSICIAN ASSISTANT

## 2024-01-01 PROCEDURE — 99239 HOSP IP/OBS DSCHRG MGMT >30: CPT | Performed by: INTERNAL MEDICINE

## 2024-01-01 PROCEDURE — 210N000002 HC R&B HEART CARE

## 2024-01-01 PROCEDURE — 250N000009 HC RX 250: Performed by: EMERGENCY MEDICINE

## 2024-01-01 PROCEDURE — 83605 ASSAY OF LACTIC ACID: CPT | Performed by: EMERGENCY MEDICINE

## 2024-01-01 PROCEDURE — 99305 1ST NF CARE MODERATE MDM 35: CPT | Performed by: INTERNAL MEDICINE

## 2024-01-01 PROCEDURE — 97535 SELF CARE MNGMENT TRAINING: CPT | Mod: GO

## 2024-01-01 PROCEDURE — 93225 XTRNL ECG REC<48 HRS REC: CPT

## 2024-01-01 PROCEDURE — 83036 HEMOGLOBIN GLYCOSYLATED A1C: CPT | Mod: ORL | Performed by: FAMILY MEDICINE

## 2024-01-01 PROCEDURE — 85014 HEMATOCRIT: CPT | Performed by: PHYSICIAN ASSISTANT

## 2024-01-01 PROCEDURE — 84100 ASSAY OF PHOSPHORUS: CPT | Performed by: INTERNAL MEDICINE

## 2024-01-01 PROCEDURE — 99233 SBSQ HOSP IP/OBS HIGH 50: CPT | Performed by: INTERNAL MEDICINE

## 2024-01-01 PROCEDURE — 97530 THERAPEUTIC ACTIVITIES: CPT | Mod: GP

## 2024-01-01 PROCEDURE — 87086 URINE CULTURE/COLONY COUNT: CPT | Performed by: EMERGENCY MEDICINE

## 2024-01-01 PROCEDURE — 99221 1ST HOSP IP/OBS SF/LOW 40: CPT | Mod: FS | Performed by: STUDENT IN AN ORGANIZED HEALTH CARE EDUCATION/TRAINING PROGRAM

## 2024-01-01 PROCEDURE — 82550 ASSAY OF CK (CPK): CPT | Performed by: INTERNAL MEDICINE

## 2024-01-01 PROCEDURE — 93970 EXTREMITY STUDY: CPT

## 2024-01-01 PROCEDURE — 80048 BASIC METABOLIC PNL TOTAL CA: CPT | Performed by: EMERGENCY MEDICINE

## 2024-01-01 PROCEDURE — 71101 X-RAY EXAM UNILAT RIBS/CHEST: CPT | Mod: RT

## 2024-01-01 PROCEDURE — 84484 ASSAY OF TROPONIN QUANT: CPT | Performed by: EMERGENCY MEDICINE

## 2024-01-01 PROCEDURE — 97166 OT EVAL MOD COMPLEX 45 MIN: CPT | Mod: GO

## 2024-01-01 PROCEDURE — 85041 AUTOMATED RBC COUNT: CPT | Performed by: EMERGENCY MEDICINE

## 2024-01-01 PROCEDURE — 93000 ELECTROCARDIOGRAM COMPLETE: CPT | Performed by: NURSE PRACTITIONER

## 2024-01-01 PROCEDURE — 82550 ASSAY OF CK (CPK): CPT | Performed by: EMERGENCY MEDICINE

## 2024-01-01 PROCEDURE — 99316 NF DSCHRG MGMT 30 MIN+: CPT | Performed by: PHYSICIAN ASSISTANT

## 2024-01-01 RX ORDER — FLUTICASONE PROPIONATE 50 MCG
1 SPRAY, SUSPENSION (ML) NASAL DAILY
COMMUNITY

## 2024-01-01 RX ORDER — OXYCODONE HYDROCHLORIDE 5 MG/1
5 TABLET ORAL ONCE
Status: COMPLETED | OUTPATIENT
Start: 2024-01-01 | End: 2024-01-01

## 2024-01-01 RX ORDER — SODIUM CHLORIDE 9 MG/ML
INJECTION, SOLUTION INTRAVENOUS CONTINUOUS
Status: ACTIVE | OUTPATIENT
Start: 2024-01-01 | End: 2024-01-01

## 2024-01-01 RX ORDER — CEFTRIAXONE 1 G/1
1 INJECTION, POWDER, FOR SOLUTION INTRAMUSCULAR; INTRAVENOUS ONCE
Status: COMPLETED | OUTPATIENT
Start: 2024-01-01 | End: 2024-01-01

## 2024-01-01 RX ORDER — ACETAMINOPHEN 325 MG/1
650 TABLET ORAL EVERY 4 HOURS PRN
Status: DISCONTINUED | OUTPATIENT
Start: 2024-01-01 | End: 2024-01-01 | Stop reason: HOSPADM

## 2024-01-01 RX ORDER — LIDOCAINE HYDROCHLORIDE 20 MG/ML
JELLY TOPICAL ONCE
Status: COMPLETED | OUTPATIENT
Start: 2024-01-01 | End: 2024-01-01

## 2024-01-01 RX ORDER — SODIUM CHLORIDE 9 MG/ML
INJECTION, SOLUTION INTRAVENOUS CONTINUOUS
Status: DISCONTINUED | OUTPATIENT
Start: 2024-01-01 | End: 2024-01-01

## 2024-01-01 RX ORDER — NICOTINE POLACRILEX 4 MG
15-30 LOZENGE BUCCAL
Status: DISCONTINUED | OUTPATIENT
Start: 2024-01-01 | End: 2024-01-01 | Stop reason: HOSPADM

## 2024-01-01 RX ORDER — LIDOCAINE HYDROCHLORIDE 20 MG/ML
10 JELLY TOPICAL ONCE
Status: COMPLETED | OUTPATIENT
Start: 2024-01-01 | End: 2024-01-01

## 2024-01-01 RX ORDER — CEPHALEXIN 500 MG/1
500 CAPSULE ORAL 2 TIMES DAILY
Qty: 14 CAPSULE | Refills: 0 | Status: SHIPPED | OUTPATIENT
Start: 2024-01-01 | End: 2024-01-01

## 2024-01-01 RX ORDER — LOSARTAN POTASSIUM AND HYDROCHLOROTHIAZIDE 12.5; 1 MG/1; MG/1
1 TABLET ORAL EVERY MORNING
Status: ON HOLD | COMMUNITY
End: 2024-01-01

## 2024-01-01 RX ORDER — AMOXICILLIN 250 MG
2 CAPSULE ORAL 2 TIMES DAILY PRN
Status: DISCONTINUED | OUTPATIENT
Start: 2024-01-01 | End: 2024-01-01 | Stop reason: HOSPADM

## 2024-01-01 RX ORDER — LIDOCAINE 40 MG/G
CREAM TOPICAL
Status: DISCONTINUED | OUTPATIENT
Start: 2024-01-01 | End: 2024-01-01 | Stop reason: HOSPADM

## 2024-01-01 RX ORDER — SIMVASTATIN 40 MG
40 TABLET ORAL AT BEDTIME
COMMUNITY

## 2024-01-01 RX ORDER — MAGNESIUM SULFATE HEPTAHYDRATE 40 MG/ML
2 INJECTION, SOLUTION INTRAVENOUS ONCE
Status: COMPLETED | OUTPATIENT
Start: 2024-01-01 | End: 2024-01-01

## 2024-01-01 RX ORDER — IOPAMIDOL 755 MG/ML
67 INJECTION, SOLUTION INTRAVASCULAR ONCE
Status: COMPLETED | OUTPATIENT
Start: 2024-01-01 | End: 2024-01-01

## 2024-01-01 RX ORDER — ASPIRIN 81 MG/1
81 TABLET ORAL DAILY
Status: DISCONTINUED | OUTPATIENT
Start: 2024-01-01 | End: 2024-01-01 | Stop reason: HOSPADM

## 2024-01-01 RX ORDER — LEVOTHYROXINE SODIUM 75 UG/1
75 TABLET ORAL
Status: DISCONTINUED | OUTPATIENT
Start: 2024-01-01 | End: 2024-01-01 | Stop reason: HOSPADM

## 2024-01-01 RX ORDER — FINASTERIDE 5 MG/1
5 TABLET, FILM COATED ORAL DAILY
Qty: 90 TABLET | Refills: 3 | Status: SHIPPED | OUTPATIENT
Start: 2024-01-01

## 2024-01-01 RX ORDER — LEVOTHYROXINE SODIUM 75 UG/1
75 TABLET ORAL
COMMUNITY

## 2024-01-01 RX ORDER — CIPROFLOXACIN 500 MG/1
500 TABLET, FILM COATED ORAL ONCE
Qty: 1 TABLET | Refills: 0 | Status: SHIPPED | OUTPATIENT
Start: 2024-01-01 | End: 2024-01-01

## 2024-01-01 RX ORDER — ACETAMINOPHEN 650 MG/1
650 SUPPOSITORY RECTAL EVERY 4 HOURS PRN
Status: DISCONTINUED | OUTPATIENT
Start: 2024-01-01 | End: 2024-01-01 | Stop reason: HOSPADM

## 2024-01-01 RX ORDER — AMOXICILLIN 250 MG
1 CAPSULE ORAL 2 TIMES DAILY PRN
Status: DISCONTINUED | OUTPATIENT
Start: 2024-01-01 | End: 2024-01-01 | Stop reason: HOSPADM

## 2024-01-01 RX ORDER — SULFAMETHOXAZOLE/TRIMETHOPRIM 800-160 MG
1 TABLET ORAL ONCE
Qty: 1 TABLET | Refills: 0 | Status: SHIPPED | OUTPATIENT
Start: 2024-01-01 | End: 2024-01-01

## 2024-01-01 RX ORDER — CALCIUM CARBONATE 500 MG/1
1000 TABLET, CHEWABLE ORAL 4 TIMES DAILY PRN
Status: DISCONTINUED | OUTPATIENT
Start: 2024-01-01 | End: 2024-01-01 | Stop reason: HOSPADM

## 2024-01-01 RX ORDER — DEXTROSE MONOHYDRATE 25 G/50ML
25-50 INJECTION, SOLUTION INTRAVENOUS
Status: DISCONTINUED | OUTPATIENT
Start: 2024-01-01 | End: 2024-01-01 | Stop reason: HOSPADM

## 2024-01-01 RX ORDER — ASPIRIN 81 MG/1
81 TABLET ORAL DAILY
COMMUNITY

## 2024-01-01 RX ORDER — ACETAMINOPHEN 325 MG/1
650 TABLET ORAL EVERY 6 HOURS PRN
COMMUNITY

## 2024-01-01 RX ORDER — HEPARIN SODIUM 10000 [USP'U]/100ML
0-5000 INJECTION, SOLUTION INTRAVENOUS CONTINUOUS
Status: DISCONTINUED | OUTPATIENT
Start: 2024-01-01 | End: 2024-01-01

## 2024-01-01 RX ADMIN — HEPARIN SODIUM 1000 UNITS/HR: 10000 INJECTION, SOLUTION INTRAVENOUS at 16:53

## 2024-01-01 RX ADMIN — SODIUM CHLORIDE 1000 ML: 9 INJECTION, SOLUTION INTRAVENOUS at 15:46

## 2024-01-01 RX ADMIN — ACETAMINOPHEN 650 MG: 325 TABLET, FILM COATED ORAL at 18:43

## 2024-01-01 RX ADMIN — ACETAMINOPHEN 650 MG: 325 TABLET, FILM COATED ORAL at 09:55

## 2024-01-01 RX ADMIN — SODIUM CHLORIDE: 9 INJECTION, SOLUTION INTRAVENOUS at 22:13

## 2024-01-01 RX ADMIN — MAGNESIUM SULFATE HEPTAHYDRATE 2 G: 40 INJECTION, SOLUTION INTRAVENOUS at 14:00

## 2024-01-01 RX ADMIN — LEVOTHYROXINE SODIUM 75 MCG: 75 TABLET ORAL at 06:59

## 2024-01-01 RX ADMIN — SODIUM CHLORIDE 500 ML: 9 INJECTION, SOLUTION INTRAVENOUS at 12:30

## 2024-01-01 RX ADMIN — OXYCODONE HYDROCHLORIDE 5 MG: 5 TABLET ORAL at 00:50

## 2024-01-01 RX ADMIN — CEFTRIAXONE SODIUM 1 G: 1 INJECTION, POWDER, FOR SOLUTION INTRAMUSCULAR; INTRAVENOUS at 17:37

## 2024-01-01 RX ADMIN — SODIUM CHLORIDE 1000 ML: 9 INJECTION, SOLUTION INTRAVENOUS at 11:50

## 2024-01-01 RX ADMIN — LEVOTHYROXINE SODIUM 75 MCG: 75 TABLET ORAL at 06:36

## 2024-01-01 RX ADMIN — METFORMIN HYDROCHLORIDE 850 MG: 850 TABLET, FILM COATED ORAL at 18:39

## 2024-01-01 RX ADMIN — SODIUM CHLORIDE: 9 INJECTION, SOLUTION INTRAVENOUS at 06:43

## 2024-01-01 RX ADMIN — ASPIRIN 81 MG: 81 TABLET, COATED ORAL at 13:22

## 2024-01-01 RX ADMIN — SODIUM CHLORIDE: 9 INJECTION, SOLUTION INTRAVENOUS at 14:31

## 2024-01-01 RX ADMIN — SODIUM CHLORIDE: 9 INJECTION, SOLUTION INTRAVENOUS at 16:55

## 2024-01-01 RX ADMIN — SODIUM CHLORIDE 100 ML: 9 INJECTION, SOLUTION INTRAVENOUS at 12:33

## 2024-01-01 RX ADMIN — LIDOCAINE HYDROCHLORIDE 5 ML: 20 JELLY TOPICAL at 14:25

## 2024-01-01 RX ADMIN — LEVOTHYROXINE SODIUM 75 MCG: 75 TABLET ORAL at 09:55

## 2024-01-01 RX ADMIN — ACETAMINOPHEN 650 MG: 325 TABLET, FILM COATED ORAL at 00:17

## 2024-01-01 RX ADMIN — IOPAMIDOL 67 ML: 755 INJECTION, SOLUTION INTRAVENOUS at 12:33

## 2024-01-01 RX ADMIN — ASPIRIN 81 MG: 81 TABLET, COATED ORAL at 09:53

## 2024-01-01 RX ADMIN — LIDOCAINE HYDROCHLORIDE 6 ML: 20 JELLY TOPICAL at 16:11

## 2024-01-01 RX ADMIN — LIDOCAINE HYDROCHLORIDE: 20 JELLY TOPICAL at 11:14

## 2024-01-01 RX ADMIN — SODIUM CHLORIDE: 9 INJECTION, SOLUTION INTRAVENOUS at 08:42

## 2024-01-01 RX ADMIN — MAGNESIUM SULFATE HEPTAHYDRATE 2 G: 40 INJECTION, SOLUTION INTRAVENOUS at 17:46

## 2024-01-01 RX ADMIN — ACETAMINOPHEN 650 MG: 325 TABLET, FILM COATED ORAL at 18:19

## 2024-01-01 RX ADMIN — ASPIRIN 81 MG: 81 TABLET, COATED ORAL at 10:11

## 2024-01-01 RX ADMIN — ACETAMINOPHEN 650 MG: 325 TABLET, FILM COATED ORAL at 23:37

## 2024-01-01 RX ADMIN — LEVOTHYROXINE SODIUM 75 MCG: 75 TABLET ORAL at 10:11

## 2024-01-01 RX ADMIN — SODIUM CHLORIDE: 9 INJECTION, SOLUTION INTRAVENOUS at 22:10

## 2024-01-01 RX ADMIN — SODIUM CHLORIDE: 9 INJECTION, SOLUTION INTRAVENOUS at 15:59

## 2024-01-01 RX ADMIN — ACETAMINOPHEN 650 MG: 325 TABLET, FILM COATED ORAL at 18:39

## 2024-01-01 RX ADMIN — HEPARIN SODIUM 850 UNITS/HR: 10000 INJECTION, SOLUTION INTRAVENOUS at 15:59

## 2024-01-01 RX ADMIN — SODIUM CHLORIDE 500 ML: 9 INJECTION, SOLUTION INTRAVENOUS at 16:30

## 2024-01-01 RX ADMIN — METFORMIN HYDROCHLORIDE 850 MG: 850 TABLET, FILM COATED ORAL at 09:53

## 2024-01-01 ASSESSMENT — ACTIVITIES OF DAILY LIVING (ADL)
ADLS_ACUITY_SCORE: 38
ADLS_ACUITY_SCORE: 32
ADLS_ACUITY_SCORE: 32
ADLS_ACUITY_SCORE: 35
ADLS_ACUITY_SCORE: 32
ADLS_ACUITY_SCORE: 38
ADLS_ACUITY_SCORE: 32
ADLS_ACUITY_SCORE: 38
ADLS_ACUITY_SCORE: 38
ADLS_ACUITY_SCORE: 28
ADLS_ACUITY_SCORE: 30
ADLS_ACUITY_SCORE: 32
ADLS_ACUITY_SCORE: 28
ADLS_ACUITY_SCORE: 32
ADLS_ACUITY_SCORE: 38
ADLS_ACUITY_SCORE: 32
ADLS_ACUITY_SCORE: 38
ADLS_ACUITY_SCORE: 32
ADLS_ACUITY_SCORE: 36
ADLS_ACUITY_SCORE: 32
ADLS_ACUITY_SCORE: 38
ADLS_ACUITY_SCORE: 38
ADLS_ACUITY_SCORE: 35
ADLS_ACUITY_SCORE: 42
ADLS_ACUITY_SCORE: 38
ADLS_ACUITY_SCORE: 32
ADLS_ACUITY_SCORE: 28
ADLS_ACUITY_SCORE: 32
ADLS_ACUITY_SCORE: 38
ADLS_ACUITY_SCORE: 32
ADLS_ACUITY_SCORE: 32
ADLS_ACUITY_SCORE: 28
ADLS_ACUITY_SCORE: 35
ADLS_ACUITY_SCORE: 32
ADLS_ACUITY_SCORE: 35
ADLS_ACUITY_SCORE: 32
ADLS_ACUITY_SCORE: 35
ADLS_ACUITY_SCORE: 32
ADLS_ACUITY_SCORE: 32
ADLS_ACUITY_SCORE: 38
ADLS_ACUITY_SCORE: 32
ADLS_ACUITY_SCORE: 32

## 2024-01-01 ASSESSMENT — COLUMBIA-SUICIDE SEVERITY RATING SCALE - C-SSRS
2. HAVE YOU ACTUALLY HAD ANY THOUGHTS OF KILLING YOURSELF IN THE PAST MONTH?: NO
6. HAVE YOU EVER DONE ANYTHING, STARTED TO DO ANYTHING, OR PREPARED TO DO ANYTHING TO END YOUR LIFE?: NO
1. IN THE PAST MONTH, HAVE YOU WISHED YOU WERE DEAD OR WISHED YOU COULD GO TO SLEEP AND NOT WAKE UP?: NO

## 2024-01-01 ASSESSMENT — VISUAL ACUITY: OS: OTHER (SEE COMMENTS)

## 2024-01-01 ASSESSMENT — PAIN SCALES - GENERAL: PAINLEVEL: NO PAIN (0)

## 2024-01-19 PROBLEM — E27.9 ADRENAL NODULE (H): Status: ACTIVE | Noted: 2024-01-01

## 2024-01-19 PROBLEM — R55 SYNCOPE, UNSPECIFIED SYNCOPE TYPE: Status: ACTIVE | Noted: 2024-01-01

## 2024-01-19 PROBLEM — I48.91 ATRIAL FIBRILLATION, UNSPECIFIED TYPE (H): Status: ACTIVE | Noted: 2024-01-01

## 2024-01-19 PROBLEM — R53.1 GENERALIZED WEAKNESS: Status: ACTIVE | Noted: 2024-01-01

## 2024-01-19 PROBLEM — E83.42 HYPOMAGNESEMIA: Status: ACTIVE | Noted: 2024-01-01

## 2024-01-19 PROBLEM — T79.6XXA TRAUMATIC RHABDOMYOLYSIS, INITIAL ENCOUNTER (H): Status: ACTIVE | Noted: 2024-01-01

## 2024-01-19 PROBLEM — R31.9 HEMATURIA, UNSPECIFIED TYPE: Status: ACTIVE | Noted: 2024-01-01

## 2024-01-19 PROBLEM — N13.9 OBSTRUCTION TO URINARY OUTFLOW: Status: ACTIVE | Noted: 2024-01-01

## 2024-01-19 NOTE — H&P
[facility]    History and Physical - Hospitalist Service       Date of Admission:  1/19/2024    Assessment & Plan      Parviz Yip is a 83 year old male with past medical history significant for Hypertension, Diabetes, asthma admitted on 1/19/2024 after an unwitnessed fall. He is found to have new atrial fibrillation as well as mild troponin elevation and mild CK elevation.     Unwitnessed Fall   Generalized weakness   Mild rhabdomyolysis   The patient presents to the ED after an unwitnessed fall at home. He was reportedly in his usual state of health yesterday. He went to [place], and then visited some friends. When he got home he thinks he was making some thing to eat and then somehow ended up falling. He does not actually recall how he fell, but thinks he must have tripped or lost his balance.   He was found on the ground in his apartment this morning by some friends after he did not show up for their regular breakfast date. He denied any dizziness. He denies any current pain. No injuries were identified in the ED. He does have a mild CK elevation consistent with some mild rhabdomyolysis.   - Admit to inpatient   - Continue IV fluids   - Trend CK   - PT/OT  - CC/SW     Questionable vulnerable adult?   Questionable underlying dementia vs acute encephalopathy?   Of note, the patient initially did not have any emergency contact listed. When I asked who I could call he was unable to given me a name or number of any contact. Ultimately and emergency contact was put in the chart (Lashae Castañeda). I was able to speak with her over the phone. She notes that they have been friends for 30-40 years, but he was never  and has no children. She does not know of any family members. Apparently he was the [employer] [facility] for several years.   He currently lives alone in a small apartment. Lashae has not been concerned about any underlying dementia and generally seems pretty sharp. Parviz  mentioned something to me about being involved in some sort of a scam but was unable to give any details. Lashae did not know anything about a scam.   - OT consult with cognitive evaluation   - CC/SW consultation for dispo     New onset atrial fibrillation   EKG in the ED showed atrial fibrillation. I cannot see any history of atrial fibrillation in the chart; however it looks like he gets his primary care at [facility] which I do not see records for. The patient is unaware of a prior diagnosis of atrial fibrillation, but it is unclear how accurate this is.   Rate is currently controlled.   Discussed briefly risk/benefit of anticoagulation, however it did not seem like he would be able to make these type of complex medical decisions currently.   - Cardiac monitoring   - Will start low intensity heparin tonight, could consider DOAC in AM   - Pharmacy consult for DOAC coverage.   - TTE ordered   - Will consult cardiology.      Mild troponin elevation   Troponin is elevated to 26 on admission. Suspect some demand ischemia in the setting of a fib, rhabdo, etc.   - Monitor   - TTE, cardiology consulted as noted above     Microscopic hematuria   Bladder outlet obstruction and Mild R sided hydronephrosis   Noted on CT scan. Pt voiding spontaneously.   - PRN bladder scans   - Monitor hematuria with initiation of anticoagulation   - Consider urology consultation vs referral at discharge     Mild age indeterminate T12 vertebral compression fracture  Noted on CT scan. Pt currently denies any pain   - Monitor     Hypomagnesemia  - Replace per protocol       Bilateral adrenal nodules   Incidental finding on CT scan   - Recommend outpatient monitoring     Hypothyroidism  - Resume PTA levothyroxine  - Check TSH/Free T4       Diet: Regular Diet  DVT Prophylaxis: Heparin   Pineda Catheter: Not present  Lines: None     Cardiac Monitoring: None  Code Status: DNR/DNI - while I do think the patient has some confusion  "as noted above. He does seem to have moments of clarity. When I asked if he would ever want chest compressions or CPR in the event that his heart stops, he quite definitively said \"no.\" Additionally when I asked if he would ever want a breathing tube if he were unable to breath on his own he again said, \"no.\" Given that he is 83 years of age, I think a DNR/DNI status is medically very reasonable.     Clinically Significant Risk Factors Present on Admission            # Hypomagnesemia: Lowest Mg = 1.6 mg/dL in last 2 days, will replace as needed                       Disposition Plan             Keely Bagley MD  Hospitalist Service  [facility]  Securely message with [employer] Capture (more info)  Text page via [employer] Paging/Directory     ______________________________________________________________________    Chief Complaint   Unwitnessed fall     History is obtained from the patient    History of Present Illness   Parviz Yip is a 83 year old male who presents to the ED after an unwitnessed fall at home. He was reportedly in his usual state of health yesterday. He went to [employer], and then visited some friends. When he got home he thinks he was making some thing to eat and then somehow ended up falling. He does not actually recall how he fell, but thinks he must have tripped or lost his balance. He denies any preceding dizziness or chest pain.     I was able to gather some additional information from his emergency contact Lashae Castañeda. Apparently they have a weekly breakfast date on Fridays with a group of friends. Her and her  usually pick him up on Fridays, but this morning he did not answer the door when she knocked.   After breakfast they decided to go back and check on him. They were ultimately able to get into his apartment and found him lying on the ground with a pillow and a blanket.     She notes that she has been friends with him for 40 years and has never been in his " apartment before. She notes that it is very small - just one room with a twin bed and a chair. It is in the basement level of the building and there are only a few egress windows. She notes that he is a very private person but is generally pretty sharp. He has no concern for dementia. She reports that he is a lifelong bachelor- no kids. She does not know of any other family members. 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     Parviz mentioned something to me about possibly being involved in a scam. The details of which are unclear. Lashae does not know anything about this.           Past Medical History    Past Medical History:   Diagnosis Date    Diabetes (H)     Hypertension     Uncomplicated asthma        Past Surgical History   Past Surgical History:   Procedure Laterality Date    SOFT TISSUE SURGERY         Prior to Admission Medications   Prior to Admission Medications   Prescriptions Last Dose Informant Patient Reported? Taking?   bacitracin 500 UNIT/GM external ointment   No No   Sig: Apply a thin amount to your forehead 3 times a day for the next 5 to 7 days.  Avoid getting this into your eyes.      Facility-Administered Medications: None        Review of Systems    The 10 point Review of Systems is negative other than noted in the HPI or here.     Physical Exam   Vital Signs: Temp: 97.9  F (36.6  C) Temp src: Oral BP: 128/72 Pulse: 56   Resp: 14 SpO2: 99 % O2 Device: None (Room air)    Weight: 0 lbs 0 oz    Constitutional: Awake, alert, cooperative, no apparent distress. Appears weak.  Eyes: Conjunctiva and pupils examined and normal.  HEENT: Dry mucous membranes, normal dentition.  Respiratory: Clear to auscultation bilaterally, no crackles or wheezing.  Cardiovascular: Irregularly irregular rhythm, normal rate, normal S1 and S2, and no murmur noted.  GI: Soft, non-distended, non-tender, normal bowel sounds.  Skin: No rashes, no cyanosis, 1-2+ bilateral lower extremity  edema.  Musculoskeletal: No joint swelling, erythema or tenderness.  Neurologic: Cranial nerves 2-12 intact, normal strength and sensation.  Psychiatric: Alert, oriented to person, place and time, somewhat confused about details of events regarding the fall.       Medical Decision Making       75 MINUTES SPENT BY ME on the date of service doing chart review, history, exam, documentation & further activities per the note.      Data     I have personally reviewed the following data over the past 24 hrs:    9.5  \   13.0 (L)   / 190     140 103 24.8 (H) /  112 (H)   4.4 26 0.93 \     Trop: 29 (H) BNP: N/A     Procal: N/A CRP: N/A Lactic Acid: 1.7         Imaging results reviewed over the past 24 hrs:   Recent Results (from the past 24 hour(s))   Ribs XR, unilat 3 views + PA chest, right    Narrative    Examination:  XR RIBS & CHEST RT 3VW    Date:  1/19/2024 12:56 PM     Clinical Information: Altered mental status, fall, pain/bruise to the  right upper lateral ribs    Comparison: none.      Impression    Impression:    1.  No displaced rib fracture. No nondisplaced rib fracture or rib  lesion visualized.    2.  Clear lungs without infiltrate, pleural effusion, or pneumothorax.  Normal heart size and silhouette.    3.  Moderate endplate hypertrophic changes throughout the thoracic  spine.    WADE MAR MD         SYSTEM ID:  [id number]   Head CT w/o contrast    Narrative    CT HEAD W/O CONTRAST, CTA HEAD NECK W CONTRAST 1/19/2024 12:57 PM    CT head without contrast  CT angiogram of the neck   CT angiogram of the base of the brain with contrast  Reconstruction on the 3D workstation    Provided History:  Generalized weakness with fall before 5 PM  yesterday and new onset A-fib, no focal weakness, but suspect stroke    Comparison:  Head CT 12/23/2001.      Technique:  Head CT:Using multidetector thin collimation helical acquisition  technique, axial, coronal and sagittal CT images from the skull base  to the  vertex were obtained without intravenous contrast.   (topogram) image(s) also obtained and reviewed.  HEAD and NECK CTA: During rapid bolus intravenous injection of  nonionic contrast material, axial images were obtained using thin  collimation multidetector helical technique from the base of the upper  aortic arch through the [place]. This CT angiogram data was  reconstructed at thin intervals with mild overlap. Images were sent to  the 3D workstation, and 3D reconstructions were obtained. The axial  source images, multiplanar reformations, 3D reconstructions in both  maximum intensity projection display and volume rendered models were  reviewed, with reconstructions performed by the technologist.    Contrast: 67mL [id number]    Findings:    Head CT demonstrates no intracranial hemorrhage, mass effect, or  midline shift. Gray/white matter differentiation in both cerebral  hemispheres is preserved. Ventricles are proportionate to the cerebral  sulci. The basal cisterns are clear. Advanced diffuse cerebral volume  loss and patchy periventricular hypoattenuation, consistent with  chronic small vessel ischemic disease.    The bony calvaria and the bones of the skull base are normal.  Scattered paranasal sinus mucosal thickening. Mastoid air cells are  clear.    Head CTA demonstrates no aneurysm or stenosis of the major  intracranial arteries. Fetal origin of right PCA.     Neck CTA demonstrates no stenosis of the major cervical arteries. The  origins of the great vessels from the aortic arch are patent.     No mass within the visualized portions of the cervical soft tissues or  lung apices.     Cervical spondylosis.      Impression    Impression:    1. Chronic small vessel ischemic disease and advanced diffuse cerebral  volume loss.  2. Head CTA demonstrates no aneurysm or stenosis of the major  intracranial arteries.   3. Neck CTA demonstrates no stenosis of the major cervical arteries.    MENDOZA  MD RJ         SYSTEM ID:  [id number]   CTA Head Neck with Contrast    Narrative    CT HEAD W/O CONTRAST, CTA HEAD NECK W CONTRAST 1/19/2024 12:57 PM    CT head without contrast  CT angiogram of the neck   CT angiogram of the base of the brain with contrast  Reconstruction on the 3D workstation    Provided History:  Generalized weakness with fall before 5 PM  yesterday and new onset A-fib, no focal weakness, but suspect stroke    Comparison:  Head CT 12/23/2001.      Technique:  Head CT:Using multidetector thin collimation helical acquisition  technique, axial, coronal and sagittal CT images from the skull base  to the vertex were obtained without intravenous contrast.   (topogram) image(s) also obtained and reviewed.  HEAD and NECK CTA: During rapid bolus intravenous injection of  nonionic contrast material, axial images were obtained using thin  collimation multidetector helical technique from the base of the upper  aortic arch through the [place]. This CT angiogram data was  reconstructed at thin intervals with mild overlap. Images were sent to  the 3D workstation, and 3D reconstructions were obtained. The axial  source images, multiplanar reformations, 3D reconstructions in both  maximum intensity projection display and volume rendered models were  reviewed, with reconstructions performed by the technologist.    Contrast: 67mL ISOVUE-[id number]    Findings:    Head CT demonstrates no intracranial hemorrhage, mass effect, or  midline shift. Gray/white matter differentiation in both cerebral  hemispheres is preserved. Ventricles are proportionate to the cerebral  sulci. The basal cisterns are clear. Advanced diffuse cerebral volume  loss and patchy periventricular hypoattenuation, consistent with  chronic small vessel ischemic disease.    The bony calvaria and the bones of the skull base are normal.  Scattered paranasal sinus mucosal thickening. Mastoid air cells are  clear.    Head CTA demonstrates no  aneurysm or stenosis of the major  intracranial arteries. Fetal origin of right PCA.     Neck CTA demonstrates no stenosis of the major cervical arteries. The  origins of the great vessels from the aortic arch are patent.     No mass within the visualized portions of the cervical soft tissues or  lung apices.     Cervical spondylosis.      Impression    Impression:    1. Chronic small vessel ischemic disease and advanced diffuse cerebral  volume loss.  2. Head CTA demonstrates no aneurysm or stenosis of the major  intracranial arteries.   3. Neck CTA demonstrates no stenosis of the major cervical arteries.    MENDOZA DE LA ROSA MD         SYSTEM ID:  [id number]   Abd/pelvis CT no contrast - Stone Protocol    Addendum: 1/19/2024    Indeterminate bilateral adrenal nodules measuring up to 1.0 cm.  Suggest follow-up MRI or CT adrenal protocol.  Scattered calcifications along the pancreas may suggest sequela of  prior pancreatitis.    JHON FOSTER MD         SYSTEM ID:  [id number]      Narrative    CT ABDOMEN PELVIS W/O CONTRAST 1/19/2024 1:29 PM    CLINICAL HISTORY: Syncope and fall, hematuria  TECHNIQUE: CT scan of the abdomen and pelvis was performed without IV  contrast. Multiplanar reformats were obtained. Dose reduction  techniques were used.  CONTRAST: None.    COMPARISON: 5/22/2009    FINDINGS:   LOWER CHEST: Coronary artery calcifications are present. Subsegmental  atelectasis    HEPATOBILIARY: No significant mass or bile duct dilatation. No  calcified gallstones.     PANCREAS: No significant mass, duct dilatation, or inflammatory  change.    SPLEEN: Normal size.    ADRENAL GLANDS: Indeterminate bilateral adrenal nodules are present  measuring up to 1.0 cm.    KIDNEYS/BLADDER: Contrast is seen within the bilateral renal  collecting systems from earlier contrast enhanced exam. No  nephrolithiasis is present. Mild right-sided hydronephrosis is seen.  Multiple diverticula are seen along the posterior bladder  wall  measuring up to 1.1 cm. Urinary bladder appears moderately distended.    BOWEL: Diverticulosis in the colon. No acute inflammatory change. No  obstruction.     LYMPH NODES: No lymphadenopathy.    VASCULATURE: Scattered vascular calcification is seen in the abdominal  aorta and iliac branches.    PELVIC ORGANS: Prostate gland is enlarged measuring 5.4 cm in  transverse diameter.    OTHER: None.    MUSCULOSKELETAL: Multilevel degenerative changes are seen in the  spine. Mild age-indeterminate compression deformity is seen along the  T12 vertebral level.      Impression    IMPRESSION:   1.  Mild right-sided hydronephrosis is present. Urinary bladder  appears distended with multiple diverticula. Constellation of findings  are suggestive of bladder outlet obstruction given enlarged prostate  gland.  2.  Mild age-indeterminate compression deformity at the T12 vertebral  level. Further characterization with spinal imaging may be considered.  3.  Indeterminate bilateral adrenal nodules measuring up to 1.0 cm.    JHON FOSTER MD         SYSTEM ID:  [id number]

## 2024-01-19 NOTE — PHARMACY-ADMISSION MEDICATION HISTORY
Pharmacist Admission Medication History    Admission medication history is complete. The information provided in this note is only as accurate as the sources available at the time of the update.    Information Source(s):  Med information at [facility]   via phone    Pertinent Information: Patient unable tp participate    Changes made to PTA medication list:  Added: All entries   Deleted: SMX/TMP  Changed: None    Medication Affordability:       Allergies reviewed with patient and updates made in EHR: no    Medication History Completed By: Cam Patel [facility] 1/19/2024 3:04 PM    PTA Med List   Medication Sig Last Dose    aspirin 81 MG EC tablet Take 81 mg by mouth daily     fluticasone (FLONASE) 50 MCG/ACT nasal spray Spray 1 spray into both nostrils daily     levothyroxine (SYNTHROID/LEVOTHROID) 75 MCG tablet Take 75 mcg by mouth daily before breakfast     losartan-hydrochlorothiazide (HYZAAR) 100-12.5 MG tablet Take 1 tablet by mouth every morning     metFORMIN (GLUCOPHAGE) 850 MG tablet Take 850 mg by mouth 2 times daily (with meals)     simvastatin (ZOCOR) 40 MG tablet Take 40 mg by mouth at bedtime

## 2024-01-19 NOTE — ED TRIAGE NOTES
Pt was found at home on the living room floor by his friends, pt does not recall falling, bg 115

## 2024-01-19 NOTE — ED NOTES
Bed: ED06  Expected date:   Expected time:   Means of arrival:   Comments:  [id number] Fall/No thinners

## 2024-01-19 NOTE — CONSULTS
"  [facility]    Stroke Telephone Note    I was called by Chas Fisher on 01/19/24 regarding patient Parviz Yip. The patient is a 83 year old male with PMH HTN, DM, brought in via EMS after he was found on the floor of his living room with generalized weakness. Reportedly was LKW 1700 last evening. Today friends were concerned as they could not get in touch with him, went to his home and found him down. Patient unable to recall details of the fall. Noted to be in atrial fibrillation by EMS which is a new finding. In the ED reported to be alert with fluent speech, generalized weakness, no clear acute focal neuro deficits.    Vitals  BP: 128/72   Pulse: 56   Resp: 14   Temp: 97.9  F (36.6  C)        Imaging Findings  CT head, CTA head/neck: 1. Chronic small vessel ischemic disease and advanced diffuse cerebral  volume loss.  2. Head CTA demonstrates no aneurysm or stenosis of the major  intracranial arteries.   3. Neck CTA demonstrates no stenosis of the major cervical arteries.    Impression  Fall and generalized weakness in the setting of new atrial fibrillation - no acute stroke seen on CT but recommend MRI to further evaluate.    Recommendations  -MRI brain w/wo contrast - please notify stroke neurology if there is evidence of stroke on MRI    Case discussed with vascular neurology attending Dr. Burrows.    My recommendations are based on the information provided over the phone by Parviz Yip's in-person providers. They are not intended to replace the clinical judgment of his in-person providers. I was not requested to personally see or examine the patient at this time.     Cornelia Vazquez PA-C  Vascular Neurology    To page me or covering stroke neurology team member, click here: AMCOM  Choose \"On Call\" tab at top, then select \"NEUROLOGY/ALL SITES\" from middle drop-down box, press Enter, then look for \"stroke\" or \"telestroke\" for your site.    "

## 2024-01-19 NOTE — ED PROVIDER NOTES
History     Chief Complaint:  Generalized Weakness    The history is provided by the patient.      Parviz Yip is a 83 year old male with a history of hypertension who presents to the ED via EMS for evaluation of generalized weakness. Patient reports that he felt normal yesterday at [employer], which was around noon. Then, he drove home. Today his friends were concerned about him because they were having difficulty contacting him. They went to his house where they found him on the living room floor and called EMS. EMS didn't find any trauma. The patient was able to bear weight, but he wasn't able to walk very much because he felt generally weak. EMS found a blood sugar of 115. Patient was unsure why he was on the floor and assumes that he may have tripped. EMS notes that the patient has bathroom rugs laying around the apartment that he could've tripped on. EMS also notes that the patient was in A fib. The patient doesn't think that he had dinner last night, which he usually has around 5:30 pm. Patient denies any pain, headache, chest pain, shortness of breath, abdominal pain, numbness, or tingling.     Independent Historian:   None - Patient Only    Review of External Notes:   12/23/21 ED note from Dr. De Jesus the patient was seen after a fall frostbite    Medications:    Bacitracin    Past Medical History:    Diabetes  Hypertension   Asthma    Past Surgical History:    Soft tissue surgery     Physical Exam   Patient Vitals for the past 24 hrs:   BP Temp Temp src Pulse Resp SpO2   01/19/24 1200 128/72 -- -- 56 14 --   01/19/24 1159 -- 97.9  F (36.6  C) Oral -- -- --   01/19/24 1137 109/78 -- -- 58 14 99 %     Physical Exam  General:  Sitting on bed.  HENT:  No obvious trauma to head. Dry oral mucosa.  Right Ear:  External ear normal.   Left Ear:  External ear normal.   Nose:  Nose normal.   Eyes:  Conjunctivae and EOM are normal. Pupils are equal, round, and reactive.   Neck: Normal range of motion. Neck supple. No  tracheal deviation present. No midline cervical neck tenderness, deformity, step off or pain in the midline with ROM.  CV:  Normal heart sounds. No murmur heard.  Pulm/Chest: Effort normal and breath sounds normal.   Abd: Soft. No distension. There is no tenderness. There is no rigidity, no rebound and no guarding.   M/S: Normal range of motion. No pain or deformity to all right extremities.  Pelvis stable  Neuro: Awake and alert. CN II-XII Grossly intact, no pronator drift, normal finger-nose-finger, visual fields intact by confrontation. Muscle strength is +5 proximal and distal in the bilateral upper and lower extremities. No dysarthria. Normal palm up, palm down.  Skin: Skin is warm and dry. No rash noted. Not diaphoretic. Small area of ecchymosis below the right axilla.   Psych: Normal mood and affect. Behavior is normal.     Emergency Department Course   ECG  ECG results from 01/19/24   EKG 12-lead, tracing only     Value    Systolic Blood Pressure     Diastolic Blood Pressure     Ventricular Rate 66    Atrial Rate     MO Interval     QRS Duration 72        QTc 425    P Axis     R AXIS 46    T Axis 57    Interpretation ECG      Atrial fibrillation with a competing junctional pacemaker  Abnormal ECG  When compared with ECG of 23-MAY-2009 19:35,  Atrial fibrillation has replaced Sinus rhythm  Vent. rate has decreased BY  34 BPM  T wave inversion no longer evident in Inferior leads  Nonspecific T wave abnormality no longer evident in Lateral leads       Imaging:  Abd/pelvis CT no contrast - Stone Protocol   Final Result   Addendum (preliminary) 1 of 1   Indeterminate bilateral adrenal nodules measuring up to 1.0 cm.   Suggest follow-up MRI or CT adrenal protocol.   Scattered calcifications along the pancreas may suggest sequela of   prior pancreatitis.      JHON FOSTER MD            SYSTEM ID:  [id number]      Final   IMPRESSION:    1.  Mild right-sided hydronephrosis is present. Urinary bladder   appears  distended with multiple diverticula. Constellation of findings   are suggestive of bladder outlet obstruction given enlarged prostate   gland.   2.  Mild age-indeterminate compression deformity at the T12 vertebral   level. Further characterization with spinal imaging may be considered.   3.  Indeterminate bilateral adrenal nodules measuring up to 1.0 cm.      JHON FOSTER MD            SYSTEM ID:  [id number]      CTA Head Neck with Contrast   Final Result   Impression:     1. Chronic small vessel ischemic disease and advanced diffuse cerebral   volume loss.   2. Head CTA demonstrates no aneurysm or stenosis of the major   intracranial arteries.    3. Neck CTA demonstrates no stenosis of the major cervical arteries.      MENDOZA DE LA ROSA MD            SYSTEM ID:  [id number]      Head CT w/o contrast   Final Result   Impression:     1. Chronic small vessel ischemic disease and advanced diffuse cerebral   volume loss.   2. Head CTA demonstrates no aneurysm or stenosis of the major   intracranial arteries.    3. Neck CTA demonstrates no stenosis of the major cervical arteries.      MENDOZA DE LA ROSA MD            SYSTEM ID:  [id number]      Ribs XR, unilat 3 views + PA chest, right   Final Result   Impression:      1.  No displaced rib fracture. No nondisplaced rib fracture or rib   lesion visualized.      2.  Clear lungs without infiltrate, pleural effusion, or pneumothorax.   Normal heart size and silhouette.      3.  Moderate endplate hypertrophic changes throughout the thoracic   spine.      WADE MAR MD            SYSTEM ID:  [id number]      MR Brain w/o Contrast    (Results Pending)     Laboratory:  Labs Ordered and Resulted from Time of ED Arrival to Time of ED Departure   BASIC METABOLIC PANEL - Abnormal       Result Value    Sodium 140      Potassium 4.4      Chloride 103      Carbon Dioxide (CO2) 26      Anion Gap 11      Urea Nitrogen 24.8 (*)     Creatinine 0.93      GFR Estimate 81      Calcium 9.3       Glucose 112 (*)    TROPONIN T, HIGH SENSITIVITY - Abnormal    Troponin T, High Sensitivity 26 (*)    ROUTINE UA WITH MICROSCOPIC REFLEX TO CULTURE - Abnormal    Color Urine Yellow      Appearance Urine Slightly Cloudy (*)     Glucose Urine Negative      Bilirubin Urine Negative      Ketones Urine Negative      Specific Gravity Urine 1.020      Blood Urine Large (*)     pH Urine 5.0      Protein Albumin Urine 20 (*)     Urobilinogen Urine Normal      Nitrite Urine Negative      Leukocyte Esterase Urine Negative      Mucus Urine Present (*)     RBC Urine >182 (*)     WBC Urine 9 (*)     Squamous Epithelials Urine <1     MAGNESIUM - Abnormal    Magnesium 1.6 (*)    CK TOTAL - Abnormal     (*)    CBC WITH PLATELETS AND DIFFERENTIAL - Abnormal    WBC Count 9.5      RBC Count 4.31 (*)     Hemoglobin 13.0 (*)     Hematocrit 38.7 (*)     MCV 90      MCH 30.2      MCHC 33.6      RDW 13.4      Platelet Count 190      % Neutrophils 90      % Lymphocytes 3      % Monocytes 7      % Eosinophils 0      % Basophils 0      % Immature Granulocytes 0      NRBCs per 100 WBC 0      Absolute Neutrophils 8.5 (*)     Absolute Lymphocytes 0.3 (*)     Absolute Monocytes 0.7      Absolute Eosinophils 0.0      Absolute Basophils 0.0      Absolute Immature Granulocytes 0.0      Absolute NRBCs 0.0     ISTAT GASES LACTATE VENOUS POCT - Abnormal    Lactic Acid POCT 1.7      Bicarbonate Venous POCT 26      O2 Sat, Venous POCT 57 (*)     pCO2 Venous POCT 43      pH Venous POCT 7.39      pO2 Venous POCT 30     INFLUENZA A/B, RSV, & SARS-COV2 PCR - Normal    Influenza A PCR Negative      Influenza B PCR Negative      RSV PCR Negative      SARS CoV2 PCR Negative     TROPONIN T, HIGH SENSITIVITY     Emergency Department Course & Assessments:    Interventions:  Medications   magnesium sulfate 2 g in 50 mL sterile water intermittent infusion (2 g Intravenous $New Bag 1/19/24 1400)   sodium chloride 0.9% BOLUS 1,000 mL (0 mLs Intravenous  Stopped 1/19/24 1400)   iopamidol (ISOVUE-370) solution 67 mL (67 mLs Intravenous $Given 1/19/24 1233)   Saline flush (100 mLs Intravenous $Given 1/19/24 1233)     Assessments:  1136 I obtained history and examined the patient as noted above.   1405 updated patient with all results and reviewed incidentals and plan of care for admission.    Independent Interpretation (X-rays, CTs, rhythm strip):  Chest x-ray shows no pneumothorax or rib fracture  Cardiac monitor shows A-fib with a controlled rate    Consultations/Discussion of Management or Tests:  ED Course as of 01/19/24 1427   Fri Jan 19, 2024   1214 I spoke with Dr. Palumbo, stroke neurology, regarding the patient's presentation and plan of care.       1404 I spoke with Cornelia Vazquez PA-C, from stroke neurology, regarding the patient's presentation and plan of care.      1424 I spoke with Dr. Bagley, hospitalist,  regarding the patient's presentation and plan of care.        Social Determinants of Health affecting care:   None    Disposition:  The patient was admitted to the hospital under the care of Dr. Bagley.     Impression & Plan    Medical Decision Making:  Parviz Yip is a very pleasant 83 year old year old patient who presents to the emergency department with concern of generalized weakness.  The patient drove to [employer] yesterday and visited with friends after that.  Patient does not remember having dinner last night so his last known well was perhaps 4 or 5 PM.  Friends tried to get a hold of the patient today but could not so drove over to his house and found him on the floor in his living room.  Paramedics reported no signs of trauma in the living room and he was able to stand up and bear weight and walk but felt very weak.  The patient denies any concerns at this time when he presented.  He denied any chest pain or headache.  He did not have any focal neurologic deficit to suggest stroke, but the EKG did show new onset atrial fibrillation with a  controlled rate.  Because of this and the generalized weakness I still considered stroke and obtain the above imaging.  Fortunately this was negative for acute stroke as allowed by this imaging modality.  The stroke team was consulted who recommended MRI and will continue to follow the patient so this was ordered in case there is a small stroke not seen by CT.  Not a TNK candidate secondary to no focal neurologic deficit and no large vessel occlusion.     Head to toe exam revealed a small bruise to the right upper lateral chest wall so a chest x-ray and rib series were obtained that did not show any rib fractures or pneumothorax.  I expanded the differential and labs were obtained and a urine analysis as well.  His CK is elevated indicating he may have been on the floor for a prolonged period of time and has a mild rhabdomyolysis.  His magnesium was low and replaced.  We obtained a urine analysis to assess for infection which was negative for UTI, but did show significant hematuria.  Because of this a CT abdomen and pelvis stone protocol was obtained that did not show any kidney stone, but showed the above findings of some mild hydronephrosis and likely urinary outlet obstruction.  There is no ureteral or kidney stone.  Postvoid residual bladder scan did show some retained urine (see RN note), but he was able to void so an emergent Pineda catheter is not indicated.  There are incidental findings of the bilateral adrenal nodules which can continue to be monitored in the outpatient setting.  His magnesium was replaced.  It is also unclear if the patient had syncope or fell or laid on the living room floor.  He can be maintained on the cardiac telemetry during his hospitalization.  He did not have any dysrhythmias while in the monitor here such as A-fib RVR.  Troponin is very slightly elevated and a delta troponin is pending.  He does not have any chest pain to suggest ACS or non-STEMI.  I spoke to the hospitalist as  above who is agreed admit the patient for continued monitoring.      Diagnosis:    ICD-10-CM    1. Atrial fibrillation, unspecified type (H)  I48.91     new onset      2. Syncope, unspecified syncope type  R55     vs fall      3. Generalized weakness  R53.1       4. Hypomagnesemia  E83.42       5. Hematuria, unspecified type  R31.9       6. Traumatic rhabdomyolysis, initial encounter (H24)  T79.6XXA       7. Adrenal nodule (H24)  E27.8     b/l      8. Obstruction to urinary outflow  N13.9         Scribe Disclosure:  I, Dexter Shrestha, am serving as a scribe at 1:46 PM on 1/19/2024 to document services personally performed by Chas Fisher DO based on my observations and the provider's statements to me.   1/19/2024   Chas Fisher DO Anderson, Robert James, DO  01/19/24 1426

## 2024-01-19 NOTE — ED NOTES
[facility]  ED Nurse Handoff Report    ED Chief complaint: Generalized Weakness      ED Diagnosis:   Final diagnoses:   Syncope, unspecified syncope type - vs fall   Generalized weakness   Hypomagnesemia   Hematuria, unspecified type   Traumatic rhabdomyolysis, initial encounter (H24)       Code Status: Full Code    Allergies: No Known Allergies    Patient Story: Parviz Yip is a 83 year old male who presents to the ED via EMS for evaluation of generalized weakness. Patient reports that he felt normal yesterday at [employer], which was around noon. Then, he drove home. Today his friends were concerned about him because they were having difficulty contacting him. They went to his house where they found him on the living room floor and called EMS. EMS didn't find any trauma. The patient was able to bear weight, but he wasn't able to walk very much because he felt generally weak. EMS found a blood sugar of 115. Patient was unsure why he was on the floor and assumes that he may have tripped. EMS notes that the patient has bathroom rugs laying around the apartment that he could've tripped on. EMS also notes that the patient was in A fib. The patient doesn't think that he had dinner last night, which he usually has around 5:30 pm. Patient denies any pain, headache, chest pain, shortness of breath, abdominal pain, numbness, or tingling.        Treatments and/or interventions provided: IVF  Patient's response to treatments and/or interventions: good    To be done/followed up on inpatient unit:  cont to monitor    Does this patient have any cognitive concerns?: Forgetful    Activity level - Baseline/Home:  Independent  Activity Level - Current:   Stand with Assist    Patient's Preferred language: English   Needed?: No    Isolation: None  Infection: Not Applicable  Patient tested for COVID 19 prior to admission: NO  Bariatric?: No    Vital Signs:   Vitals:    01/19/24 1137 01/19/24 1159   BP:  109/78    Pulse: 58    Resp: 14    Temp:  97.9  F (36.6  C)   TempSrc:  Oral   SpO2: 99%        Cardiac Rhythm:     Was the PSS-3 completed:   Yes  What interventions are required if any?               Family Comments: none  OBS brochure/video discussed/provided to patient/family: No              Name of person given brochure if not patient: na              Relationship to patient: na    For the majority of the shift this patient's behavior was Green.   Behavioral interventions performed were na.    ED NURSE PHONE NUMBER: [phone]

## 2024-01-20 NOTE — CONSULTS
"[facility]    Cardiology Consultation     Parviz Yip MRN#: [id number]   YOB: 1940 Age: 83 year old     Date of Admission:  1/19/2024    Consult Indication:  atrial fibrillation     Assessment & Plan     # Paroxysmal atrial fibrillation, intermittent sinus rhythm with first degree AVB and PACs.  New diagnosis. Asymptomatic. Auto rate controlled. [id number] 3. Falls about 3 times month.  # Elevated troponin, mild, no angina, no acute ischemic changes on ECG, clinical presentation most consistent with demand ischemia secondary to rhabdomyolysis  # Confusion, possible cognitive impairment.  Initially alert and oriented to person and time only, and not place (could not name [place] as the state he is currently in until reminded).  Otherwise he seemed to answer questions appropriately.    -Discussed anticoagulation for the primary prevention of stroke.  Given his history of falling approximately 3x/mo, possible cognitive impairment, baseline anemia, risks of bleeding must be weight against benefits of stroke prevention, [id number]-[id number] score is 3.  Patient does not seem to have an opinion about anticoagulation, \"yes it does seem there is a dilemma.\"    Discussed consideration of left atrial appendage occlusion, he did not seem very interested.    Agree with continuing heparin for now.    Agree with cognitive assessment for decision making capacity  - Would hold off on an ischemic evaluation at this time, could be considered in the outpatient setting  - Will give gentle IVF, replace magnesium  - Cardiac telemetry.  Monitor electrolytes daily, maintain potassium greater than 4, magnesium greater than 2  Cardiology will follow     Please do not hesitate to page with any questions or concerns.     Flash Acosta MD, FACC  [facility]  Cardiology  January 20, 2024    Voice recognition software utilized.   High complexity     History of Present Illness     Patient is a pleasant " 83-year-old male with a history of hypertension, diabetes, asthma, admitted after a fall, found to be in atrial fibrillation and mild rhabdomyolysis.    Patient was found down at home, EMS noted the patient was in atrial fibrillation, patient does not recall the circumstances of how he ended up down on the floor.  He denies any preceding chest pain, chest pressure.  He does endorse increased fall risk, states that he falls about 3 times a month or so, details are somewhat unclear but he states that he sometimes trips on things, denies preceding dizziness/lightheadedness.      In the ED ECG demonstrated a narrow complex rhythm, segments of regular rhythm, with significant baseline tremor artifact precluding clear assessment of atrial rhythm, ECG this morning demonstrates atrial fibrillation, subsequent ECG this afternoon demonstrates sinus rhythm with first-degree AV block.  Heart rates generally in the 60s to 70 bpm range on telemetry with intermittent sinus with first-degree AV block and frequent PACs and atrial fibrillation with controlled rate.  Troponin minimally elevated and flat in trajectory.    Patient is unmarried, no children.    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      Past Medical History   Past Medical History:   Diagnosis Date    Diabetes (H)     Hypertension     Uncomplicated asthma        Past Surgical History   Past Surgical History:   Procedure Laterality Date    SOFT TISSUE SURGERY         Prior to Admission Medications   Prior to Admission Medications   Prescriptions Last Dose Informant Patient Reported? Taking?   aspirin 81 MG EC tablet  Other Yes Yes   Sig: Take 81 mg by mouth daily   fluticasone (FLONASE) 50 MCG/ACT nasal spray  Other Yes Yes   Sig: Spray 1 spray into both nostrils daily   levothyroxine (SYNTHROID/LEVOTHROID) 75 MCG tablet  Other Yes Yes   Sig: Take 75 mcg by mouth  daily before breakfast   losartan-hydrochlorothiazide (HYZAAR) 100-12.5 MG tablet  Other Yes Yes   Sig: Take 1 tablet by mouth every morning   metFORMIN (GLUCOPHAGE) 850 MG tablet  Other Yes Yes   Sig: Take 850 mg by mouth 2 times daily (with meals)   simvastatin (ZOCOR) 40 MG tablet  Other Yes Yes   Sig: Take 40 mg by mouth at bedtime      Facility-Administered Medications: None     Current Facility-Administered Medications   Medication Dose Route Frequency    levothyroxine  75 mcg Oral QAM AC    sodium chloride (PF)  3 mL Intracatheter Q8H     Current Facility-Administered Medications   Medication Last Rate    heparin 700 Units/hr (24 0738)    - MEDICATION INSTRUCTIONS -      sodium chloride 100 mL/hr at 24 0643     Allergies   No Known Allergies    Social History    reports that he has never smoked. He has never used smokeless tobacco. He reports that he does not drink alcohol and does not use drugs.    Family History     No significant family history       Review of Systems   A comprehensive review of system was performed and is negative other than that noted in the HPI or here.     Physical Exam   Vital Signs with Ranges  Temp:  [97.3  F (36.3  C)-97.9  F (36.6  C)] 97.3  F (36.3  C)  Pulse:  [56-72] 57  Resp:  [14-20] 18  BP: ()/(49-78) 89/49  SpO2:  [97 %-99 %] 97 %  Wt Readings from Last 4 Encounters:   24 70.6 kg (155 lb 10.3 oz)   21 90.7 kg (200 lb)     I/O last 3 completed shifts:  In: 250 [P.O.:250]  Out: 1250 [Urine:1250]    Vital signs were personally reviewed:  Temperatures:  Current - Temp: 97.3  F (36.3  C); Max - Temp  Av.6  F (36.4  C)  Min: 97.3  F (36.3  C)  Max: 97.9  F (36.6  C)  Respiration range: Resp  Av.8  Min: 14  Max: 20  Pulse range: Pulse  Av.6  Min: 56  Max: 72  Blood pressure range: Systolic (24hrs), Av , Min:89 , Max:143   ; Diastolic (24hrs), Av, Min:49, Max:78    Pulse oximetry range: SpO2  Av.8 %  Min: 97 %  Max: 99  "%    Intake/Output Summary (Last 24 hours) at 1/20/2024 0837  Last data filed at 1/20/2024 0100  Gross per 24 hour   Intake 250 ml   Output 1250 ml   Net -1000 ml     155 lbs 10.32 oz  Body mass index is 21.71 kg/m .   Body surface area is 1.88 meters squared.    Physical Exam:   General/Constitutional: appears stated age, in no apparent distress, appears to be well nourished  Head: normocephalic, atraumatic  Eyes - No scleral icterus  Neck: supple, trachea midline   Respiratory: clear to auscultation bilaterally, no wheezes, no rales, no increased work of breathing  Cardiovascular: JVP normal, regular rate, irregularly irregular, 1/6 BALDOMERO at the RUSB, no lower extremity edema  Gastrointestinal: no guarding, non-rigid   Neurologic: A&O to person and time only   Skin: no jaundice, warm on limited exam    Laboratory tests personally reviewed:   CMP  Recent Labs   Lab 01/20/24  0713 01/19/24  1143    140   POTASSIUM 4.1 4.4   CHLORIDE 106 103   CO2 26 26   ANIONGAP 6* 11   * 112*   BUN 25.9* 24.8*   CR 0.98 0.93   GFRESTIMATED 77 81   AKILA 8.4* 9.3   MAG 1.8 1.6*     CBC  Recent Labs   Lab 01/20/24  0713 01/19/24  1143   WBC 6.6 9.5   RBC 3.67* 4.31*   HGB 10.9* 13.0*   HCT 33.0* 38.7*   MCV 90 90   MCH 29.7 30.2   MCHC 33.0 33.6   RDW 13.5 13.4    190     INRNo lab results found in last 7 days.  Lab Results   Component Value Date    TROPI 0.054 (H) 05/24/2009    TROPI 0.029 05/23/2009    TROPI <0.012 05/21/2009     No results for input(s): \"CHOL\", \"HDL\", \"LDL\", \"TRIG\", \"CHOLHDLRATIO\" in the last 89772 hours.  Lab Results   Component Value Date    A1C 6.3 05/21/2009     TSH   Date Value Ref Range Status   01/19/2024 2.74 0.30 - 4.20 uIU/mL Final     Clinically Significant Risk Factors            # Hypomagnesemia: Lowest Mg = 1.6 mg/dL in last 2 days, will replace as needed                        Cardiac Arrhythmia: Atrial fibrillation: Paroxysmal    Dehydration      Dementia: Unspecified dementia " without behavioral disturbance

## 2024-01-20 NOTE — PROVIDER NOTIFICATION
MD Notification    Notified Person: MD    Notified Person Name: Alice Hopson     Notification Date/Time: 1/20 0010    Notification Interaction: Vocera    Purpose of Notification: Pt here NSTEMI, Afib, Rhabdo d/t fall. Pt reporting bilateral 8/10 thigh pain. R Foot hot to touch, red. Able to palpate pulses. on IV Heparin. Do you want imaging?     do you mind placing WOC consult? abrasions on coccyx & perineum     Orders Received: WOC consult placed, Provider assessed R foot. Placed US. One time oxycodone administered.     Comments:

## 2024-01-20 NOTE — PROGRESS NOTES
"BRIEF NUTRITION ASSESSMENT    REASON FOR ASSESSMENT:  Parviz Yip is a 83 year old male seen by Registered Dietitian for Admission Nutrition Risk Screen for positive. Unsure unintentional wt loss and decreased appetite reported.     NUTRITION HISTORY:  PMH of HTN, T2DM, asthma, hypothyroidism     Pt reported that he was on the floor after falling for 2 days. During these 2 days he was unable to eat. Prior to this incident, he was eating fine/normal. Declined nutrition issues. # but now weighs \"lower.\" He is unsure when this wt loss occurred, stated he guesses he was cutting down (on PO intake) but this was unintentional, unclear time period.     Per chart review, pt has breakfast out weekly w/ friends.       CURRENT DIET AND INTAKE:  Diet: Regular            Pt reported his appetite is good. Ate \"the whole 9 yards\" of breakfast this AM. RD encouraged PO intake for repletion, wt maintenance. RD offered oral protein supplements to aid in repletion, pt agreeable, \"surprise me!\" RD encouraged pt to request to switch supplements if he does not like the supplement he is surprised with.     100% intakes documented per nursing flow sheet recently  Pt received dinner last night, has 2 meals ordered for today so far per [facility]      ANTHROPOMETRICS:  Height: 5' 11\"  Weight:  155 lbs 10.32 oz  Body mass index is 21.71 kg/m .   Weight Status: Normal BMI  IBW:  78.2 kg  %IBW: 90%  Weight History:   Significant wt loss noted sometime between 6347-2579  Inadequate wt hx on file to evaluate    01/20/24 : 70.6 kg (155 lb 10.3 oz)   12/23/21 : 90.7 kg (200 lb)     No wt hx per care everywhere      LABS:  Labs noted    MEDICATIONS:  Medications noted    MALNUTRITION:  Patient does not meet two of the following criteria necessary for diagnosing malnutrition.     % Weight Loss:  difficult to evaluate w/o adequate hx on file  % Intake:  Decreased intake does not meet criteria for malnutrition  Subcutaneous Fat Loss:  " Orbital region mild depletion and Upper arm region mild depletion-- suspect partially age-related  Muscle Loss:  Temporal region mild depletion, Clavicle bone region mild depletion, Dorsal hand region mild depletion, and Patellar region mild depletion-- suspect partially age-related  Fluid Retention:  None noted      NUTRITION INTERVENTION:  Ordered magic cup w/ dinner  RD provided encouragement for PO intake    Implementation:  Nutrition Education  Medical food supplement therapy    FOLLOW UP/MONITORING:   Will re-evaluate in 7 - 10 days, or sooner, if formally consulted.      Cher Vila RD, LD

## 2024-01-20 NOTE — PLAN OF CARE
RECEIVING UNIT ED HANDOFF REVIEW    ED Nurse Handoff Report was reviewed by: Coco Bolton RN on January 19, 2024 at 8:34 PM

## 2024-01-20 NOTE — PROGRESS NOTES
[facility]    Medicine Progress Note - Hospitalist Service    Date of Admission:  1/19/2024    Assessment & Plan    Parviz Yip is a 83 year old male with past medical history significant for Hypertension, Diabetes, asthma admitted on 1/19/2024 after an unwitnessed fall. He is found to have new atrial fibrillation as well as mild troponin elevation and mild CK elevation.      Unwitnessed Fall   Generalized weakness   Mild rhabdomyolysis   The patient presents to the ED after an unwitnessed fall at home. He was reportedly in his usual state of health day prior. He went to [employer], and then visited some friends. When he got home he thinks he was making some thing to eat and then somehow ended up falling. He does not actually recall how he fell, but thinks he must have tripped or lost his balance. He states he did not hit his head or lost consciousness.   He was found on the ground in his apartment in the morning on day of admission by some friends after he did not show up for their regular breakfast date. He denied any dizziness. He denies any current pain. No injuries were identified in the ED. He does have a mild CK elevation consistent with some mild rhabdomyolysis.   *etiology of fall is not clear, could be dehydration. He is on antihypertensive PTA and BP has been, low normal range.    -  on admission and down to 708 this am  - continue IVF  - PT/OT--> OT eval rec TCU  - CC/SW        Probable underlying cognitive impairment  Concern for possible encephalopathy on admission  *see H&P  *MRI of brain- motion degraded exam without acute finding. Chronic age related changes--refer to report for detail  *on 1/20,Spoke with patient's nephew, Krish, over the phone. He lives in [place], but Krish is the only leaving relative patient has. Erinn reports [facility]. Patient has been generally sharp until about few months ago where he noted some  decline in his memory. [place] in [place] patient and left about 3 or 4 days ago. They have started looking for [facility] facility for patient where he could get more assistance than his current living situation where he lives alone in an apartment.  Krish reports to me that he has spoke to patient earlier today and felt he was about his baseline. Krish states patient has been forgetful and as the day goes on he gets tired.   On 1/20-patient is alert and oriented. Appropriately answers questions. However, he still not sure why he fell.  - monitor for delirium  - will need cognitive assessment  - maintain sleep/wake cycle  - care management following for discharge planning       New onset atrial fibrillation   EKG in the ED showed atrial fibrillation. I cannot see any history of atrial fibrillation in the chart; however it looks like he gets his primary care at [facility] which I do not see records for. The patient is unaware of a prior diagnosis of atrial fibrillation  Rate is currently controlled without meds  Discussed briefly risk/benefit of anticoagulation, however it did not seem like he would be able to make these type of complex medical decisions currently.   - TTE LV 55-60%, no wall motion abnormality   - Cardiac monitoring   - low intensity heparin   - Pharmacy consult for DOAC coverage.   - cardiology consulted, appreciate assistance        Mild troponin elevation   Troponin is elevated to 26--29-3*on admission. Suspect some demand ischemia in the setting of a fib, rhabdo, etc.   - Monitor   - TTE, cardiology consulted as noted above      Microscopic hematuria   Bladder outlet obstruction and Mild R sided hydronephrosis   Noted on CT scan.   - needed straight cath on admission  - urinating small amounts with some residuals, but below the threshold to cath  - PRN bladder scans, monitor PVR  - Monitor hematuria with initiation of anticoagulation   - Consider urology consultation if  ongoing issue with urination  - not starting flomax at this time due to low normal blood pressure     Mild age indeterminate T12 vertebral compression fracture  Noted on CT scan. Pt currently denies any pain   - Monitor      Hypomagnesemia  - Replace per protocol      Bilateral adrenal nodules   Incidental finding on CT scan   - Recommend outpatient monitoring      Hypothyroidism  - Resume PTA levothyroxine  - TSH normal range    Right foot erythema   This is noted on 1/19-1/20 overnight. Evaluated by cross cover MD. US LE ordered and negative for DVT. Cellulitis was felt less likely based on exam  -1/20- he has mild erythema on right foot, no warmth, DP pulse present. No pain/tenderness. Able to walk on it.   - monitor, low suspicion for cellulitis. Elevate leg    HTN  PTA on Losartan and hydrochlorothiazide  - BP is low normal at this time.   - hold antihypertensives    Type II DM  No recent A1c in the system. PTA on Metformin  - sliding scale insulin ac and hs  - monitor for hypoglycemia     Patient's nephew updated by phone.         Diet: Combination Diet Regular Diet Adult  Room Service  Snacks/Supplements Adult: Magic Cup; With Meals    DVT Prophylaxis: heparin gtt as above  Pineda Catheter: Not present  Lines: None     Cardiac Monitoring: ACTIVE order. Indication: Tachyarrhythmias, acute (48 hours)  Code Status: No CPR- Do NOT Intubate      Clinically Significant Risk Factors            # Hypomagnesemia: Lowest Mg = 1.6 mg/dL in last 2 days, will replace as needed                         Disposition Plan  likely in the next 2 days, plan TCU    Expected Discharge Date: 01/21/2024                    Jewel Bansal MD  Hospitalist Service  [facility]  Securely message with [employer] (more info)  Text page via [employer] Paging/Directory   ______________________________________________________________________    Interval History   Patient reports he is doing good today. Reports some pain  intermittently in bilateral shins. Denies pain in his feet. Denies nausea, vomiting, chest pain or shortness of breath. He is afebrile.     Physical Exam   Vital Signs: Temp: 97.6  F (36.4  C) Temp src: Oral BP: 91/63 Pulse: 68   Resp: 18 SpO2: 98 % O2 Device: None (Room air)    Weight: 155 lbs 10.32 oz    General Appearance: Alert, awake and no apparent distress  Respiratory: clear to auscultation bilaterally, no wheezing  Cardiovascular: regular rate and rhythm  GI: soft and non-tender  Skin: warm and dry. Abrasion noted on left knee      Medical Decision Making       45 MINUTES SPENT BY ME on the date of service doing chart review, history, exam, documentation & further activities per the note.  MANAGEMENT DISCUSSED with the following over the past 24 hours: patient, nursing staff, care management   NOTE(S)/MEDICAL RECORDS REVIEWED over the past 24 hours: nursing notes, cardiology note  Tests ORDERED & REVIEWED in the past 24 hours:  - BMP  - CBC  - Troponin  Tests personally interpreted in the past 24 hours:        LE US result, echo report, MRI brain report  Data     I have personally reviewed the following data over the past 24 hrs:    6.6  \   10.9 (L)   / 166     138 106 25.9 (H) /  108 (H)   4.1 26 0.98 \     Trop: 39 (H) BNP: N/A     TSH: N/A T4: N/A A1C: N/A       Imaging results reviewed over the past 24 hrs:   Recent Results (from the past 24 hour(s))   MR Brain w/o Contrast    Narrative    EXAM: MR BRAIN W/O CONTRAST  LOCATION: [facility]  DATE: 1/19/2024    INDICATION: Generalized weakness.  COMPARISON: CT/CTA performed earlier today. No prior MRI is available for comparison.  TECHNIQUE: Routine multiplanar multisequence head MRI without intravenous contrast.    FINDINGS:  Motion degraded exam. Within this limitation:    INTRACRANIAL CONTENTS: No acute/subacute infarct, large extra-axial fluid collection, or acute hemorrhage. No mass effect or midline shift. Scattered  punctate and patchy foci of T2 prolongation within the bilateral cerebral white matter, nonspecific   although most likely the sequela of moderate chronic microvascular ischemia. Moderate generalized cerebral parenchymal volume loss. No hydrocephalus. Normal position of the cerebellar tonsils.     OTHER: Mild mucosal thickening scattered about the paranasal sinuses without an air-fluid level.      Impression    IMPRESSION:  1.  Motion degraded exam without an acute intracranial abnormality.  2.  Chronic age-related changes, as described.   US Lower Extremity Venous Duplex Bilateral    Narrative    EXAM: US LOWER EXTREMITY VENOUS DUPLEX BILATERAL  LOCATION: [facility]  DATE: 2024    INDICATION: Right foot warmth, recent fall.  COMPARISON: None.  TECHNIQUE: Venous Duplex ultrasound of bilateral lower extremities with and without compression, augmentation and duplex. Color flow and spectral Doppler with waveform analysis performed.    FINDINGS: Exam includes the common femoral, femoral, popliteal veins as well as segmentally visualized deep calf veins and greater saphenous vein.     RIGHT: No deep vein thrombosis. No superficial thrombophlebitis. No popliteal cyst.    LEFT: No deep vein thrombosis. No superficial thrombophlebitis. No popliteal cyst.      Impression    IMPRESSION:  1.  No deep venous thrombosis in the bilateral lower extremities.   Echocardiogram Complete   Result Value    LVEF  55-60%    Narrative    [id number]  [id number]  [id number]  2010^ALEXA^SHELBY^CRISTOBAL     [facility]  Echocardiography Laboratory  [address] [place] [address]     Name: KAREN GENAO  MRN: [id number]  : 1940  Study Date: 2024 10:38 AM  Age: 83 yrs  Gender: Male  Patient Location: [facility]  Reason For Study: Atrial Fibrillation  Ordering Physician: SHELBY LIU  Referring Physician: Cam Colunga  Performed By: Tigist Rose RDCS     BSA: 2.0  m2  Height: 71 in  Weight: 180 lb  HR: 85  BP: 110/75 mmHg  ______________________________________________________________________________  Procedure  Complete Portable Echo Adult.  ______________________________________________________________________________  Interpretation Summary     Sinus rhythm was noted.  The patient exhibited occasional PACs.  Left ventricular systolic function is normal.  The visual ejection fraction is 55-60%.  The left ventricle is normal in size.  The right ventricle is normal in structure, function and size.  There is trace mitral regurgitation.     The pericardial effusion seen on the last study 5/21/2009 has resolved. The  study is otherwise unremarkable except for premature atrial contractions.  ______________________________________________________________________________  Left Ventricle  The left ventricle is normal in size. There is normal left ventricular wall  thickness. Left ventricular systolic function is normal. The visual ejection  fraction is 55-60%. Left ventricular diastolic function is indeterminate. No  regional wall motion abnormalities noted. There is no thrombus seen in the  left ventricle.     Right Ventricle  The right ventricle is normal in structure, function and size. There is no  mass or thrombus in the right ventricle.     Atria  Normal left atrial size. Right atrial size is normal. There is no atrial shunt  seen. The left atrial appendage is not well visualized.     Mitral Valve  The mitral valve leaflets appear normal. There is no evidence of stenosis,  fluttering, or prolapse. There is trace mitral regurgitation. There is no  mitral valve stenosis.     Tricuspid Valve  Normal tricuspid valve. The right ventricular systolic pressure is  approximated at 28.5 mmHg plus the right atrial pressure. Right ventricle  systolic pressure estimate normal. There is mild (1+) tricuspid regurgitation.  There is no tricuspid stenosis.     Aortic Valve  There is mild  trileaflet aortic sclerosis. No aortic regurgitation is present.  No aortic stenosis is present.     Pulmonic Valve  Normal pulmonic valve. There is no pulmonic valvular regurgitation. There is  no pulmonic valvular stenosis.     Vessels  The aortic root is normal size. Normal size ascending aorta. The inferior vena  cava is normal. The pulmonary artery is normal size.     Pericardium  The pericardium appears normal. There is no pleural effusion.     Rhythm  Sinus rhythm was noted. The patient exhibited occasional PACs.  ______________________________________________________________________________  MMode/2D Measurements & Calculations  IVSd: 0.82 cm     LVIDd: 3.7 cm  LVIDs: 2.3 cm  LVPWd: 0.87 cm  FS: 38.4 %  LV mass(C)d: 86.4 grams  LV mass(C)dI: 42.8 grams/m2  Ao root diam: 3.2 cm  Ao root diam index Ht(cm/m): 1.8  Ao root diam index BSA (cm/m2): 1.6  LA Volume (BP): 33.2 ml  LA Volume Index (BP): 16.4 ml/m2  RWT: 0.48  TAPSE: 1.8 cm     Doppler Measurements & Calculations  MV E max mingo: 61.6 cm/sec  MV A max mingo: 66.9 cm/sec  MV E/A: 0.92  MV dec time: 0.26 sec  PA acc time: 0.07 sec  TR max mingo: 267.0 cm/sec  TR max P.5 mmHg  E/E' av.5  Lateral E/e': 3.1  Medial E/e': 5.8  RV S Mingo: 11.2 cm/sec     ______________________________________________________________________________  Report approved by: Dr. Jabari Espinoza 2024 12:57 PM

## 2024-01-20 NOTE — ED NOTES
Balaji brought in for pt. Staff previously received a call from a Krish who states he is the pt's POA. Pt reports that nothing was signed yet. No information was given to Krish at this time.     Krish [phone]

## 2024-01-20 NOTE — PROGRESS NOTES
01/20/24 0841   Appointment Info   Signing Clinician's Name / Credentials (OT) Ana Avelar OTR/L   Living Environment   People in Home alone   Current Living Arrangements apartment   Living Environment Comments [address], split level apartment. Tub shower combo with a grab bar.   Self-Care   Fall history within last six months yes   Number of times patient has fallen within last six months 2   Activity/Exercise/Self-Care Comment Has season tickets to the [employer]. Friends give him rides [place] the [place]. He does drive tho. Has friends who meet for breakfast monthly.   General Information   Onset of Illness/Injury or Date of Surgery 01/19/24   Referring Physician Kevyn   Patient/Family Therapy Goal Statement (OT) None stated.   Additional Occupational Profile Info/Pertinent History of Current Problem Admitted after unwitnessed fall, increased trops thought to be demand, however cards consult pending. -DVT, -MRI. Dx mild rhabdo.   Existing Precautions/Restrictions fall   Cognitive Status Examination   Cognitive Status Comments Off by one on day and date. Pleasant. Following all commands. Will monitor and assess as needed.   Pain Assessment   Patient Currently in Pain Yes, see Vital Sign flowsheet  (8/10)   Range of Motion Comprehensive   Comment, General Range of Motion WFL   Strength Comprehensive (MMT)   Comment, General Manual Muscle Testing (MMT) Assessment WFL   Bed Mobility   Comment (Bed Mobility) MOD A at trunk to EOB.   Transfers   Transfer Comments MIN A   Balance   Balance Comments MIN A in stance, knees flexed, kyphotic.   Activities of Daily Living   BADL Assessment/Intervention   (set up for socks, IND self feeding)   Clinical Impression   Criteria for Skilled Therapeutic Interventions Met (OT) Yes, treatment indicated   OT Diagnosis Decreased ADL   OT Problem List-Impairments impacting ADL activity tolerance impaired;balance;cognition;pain   Assessment of Occupational Performance  3-5 Performance Deficits   Planned Therapy Interventions (OT) ADL retraining;cognition;transfer training   Clinical Decision Making Complexity (OT) detailed assessment/moderate complexity   Risk & Benefits of therapy have been explained evaluation/treatment results reviewed;care plan/treatment goals reviewed;patient   OT Total Evaluation Time   OT Eval, Moderate Complexity Minutes (94512) 10   OT Goals   Therapy Frequency (OT) Daily   OT Predicted Duration/Target Date for Goal Attainment 01/23/24   OT Goals Hygiene/Grooming;Upper Body Dressing;Lower Body Dressing;Toilet Transfer/Toileting;Cognition;OT Goal 1   OT: Hygiene/Grooming independent;while standing   OT: Upper Body Dressing Independent;including set-up/clothing retrieval   OT: Lower Body Dressing Independent;including set-up/clothing retrieval   OT: Toilet Transfer/Toileting Independent;toilet transfer;cleaning and garment management   OT: Cognitive Patient/caregiver will verbalize understanding of cognitive assessment results/recommendations as needed for safe discharge planning   OT: Goal 1 Simulated tub transfer using grab bar, with MOD I.   Interventions   Interventions Quick Adds Self-Care/Home Management;Therapeutic Activity   Therapeutic Activities   Therapeutic Activity Minutes (00272) 25   Symptoms noted during/after treatment none   Treatment Detail/Skilled Intervention Second trial out of bed with MIN A at trunk, cues for hand placement. Educated patient on walker use. Improved posture with use, MIN A to transfer to chair with repeat of education for hand placement. Educated patient on dc recs and patient in agreement.   OT Discharge Planning   OT Plan grooming at sink, toilet tx   OT Discharge Recommendation (DC Rec) Transitional Care Facility   OT Rationale for DC Rec Patient below his very IND baseline, limited by pain, decreased activity tolerance, impaired balance.   OT Brief overview of current status MIN A to tx to chair with walker, for  breakfast.   Total Session Time   Timed Code Treatment Minutes 25   Total Session Time (sum of timed and untimed services) 35

## 2024-01-20 NOTE — SIGNIFICANT EVENT
Significant Event Note    Time of event: 12:31 AM January 20, 2024    Description of event:  Sock-like distribution of R foot erythema, pulse intact, nontender, hot  Patient poor historian, not sure of Acute on Chronic    Plan:  Ddx: Cellulitis (unlikely as no WBC, nontender, abnoraml distrubtion), DVT (could be though, abnornal distrubtion), PAD (unlikely as foot would be colder), Trauma (likely, could explain the rhabdo, though again not tender)  Oxy 1x  Line demarcation made to be reassessed in AM  Dopplers    Alice Hopson MD

## 2024-01-21 NOTE — PLAN OF CARE
0735-0985  [facility] gentlemen. Pt aox4, A1 gbw, RA and DNR/DNI. Tele Afib CVR. Pt denies chest pain and SOB. Neuro's intact. Heparin gtt 850 and next xa at 0400. Na gtt 100 ml/hr.   Plan: Will need TCU at discharge.

## 2024-01-21 NOTE — PLAN OF CARE
Date & Time: 1/20 8094-9127  Diagnosis: Hypomagnesemia, afib  Procedures: NA  Orientation/Cognitive: AOx4  VS/O2: VSS ex soft BP, justin, RA  Mobility: A1 + gb/walker  Diet: Regular  Pain Management: PRN tylenol  Neuro: Intact  Bowel & Bladder: Unable to void, external cath in place, bladder scan q2hr  Skin: Scattered abrasions, redness & skin tear on coccyx, R ribs bruised, RLE red/painful  Abnormal Labs: Trops 26->29->39->39, hep 10a 0.17 (recheck @ 2100), CK total 708, BUN 25.9, anion 6  Tele: Afib SVR  IV Access/Drips/Fluids: R PIV NS @ 100ml/hr, heparin @ 850u/hr  Drains: External cath  Tests/Imaging: Chest xray - negative, abd CT - R hydronephrosis, head CT/CTA - negative, MRI - negative, US BLE - negative, UA - blood in urine  Consults: Cardiology, hospitalist  Discharge Plan: Pending - TCU?

## 2024-01-21 NOTE — PROGRESS NOTES
01/21/24 1100   Appointment Info   Signing Clinician's Name / Credentials (PT) Mae Arrington, ROSALIA   Living Environment   People in Home alone   Current Living Arrangements apartment   Home Accessibility stairs to enter home   Number of Stairs, Main Entrance 6   Stair Railings, Main Entrance railings safe and in good condition;railing on right side (ascending)   Transportation Anticipated car, drives self   Self-Care   Usual Activity Tolerance good   Current Activity Tolerance moderate   Regular Exercise No   Equipment Currently Used at Home none   Fall history within last six months yes   Number of times patient has fallen within last six months 2   General Information   Onset of Illness/Injury or Date of Surgery 01/19/24   Referring Physician Dr. Bagley   Patient/Family Therapy Goals Statement (PT) To go home   Pertinent History of Current Problem (include personal factors and/or comorbidities that impact the POC) Pt is an 83 year old male admitted with a fib.   Existing Precautions/Restrictions fall   Cognition   Affect/Mental Status (Cognition) WFL   Orientation Status (Cognition) oriented x 3   Pain Assessment   Patient Currently in Pain No   Range of Motion (ROM)   Range of Motion ROM is WFL   Strength (Manual Muscle Testing)   Strength (Manual Muscle Testing) Deficits observed during functional mobility   Bed Mobility   Comment, (Bed Mobility) Min A   Transfers   Comment, (Transfers) Min A   Gait/Stairs (Locomotion)   Comment, (Gait/Stairs) 15' FWW CGA, decreased brigida and forward posture   Balance   Balance Comments Good in sitting, fair in standing   Clinical Impression   Criteria for Skilled Therapeutic Intervention Yes, treatment indicated   PT Diagnosis (PT) Impaired ambulation   Influenced by the following impairments Decreased strength, decreased endurance, decreased balance   Functional limitations due to impairments Difficulty with bed mobility, transfers, ambulation, stair management   Clinical  Presentation (PT Evaluation Complexity) stable   Clinical Presentation Rationale VSS, pain controlled   Clinical Decision Making (Complexity) low complexity   Planned Therapy Interventions (PT) balance training;bed mobility training;gait training;patient/family education;stair training;strengthening;transfer training   Risk & Benefits of therapy have been explained evaluation/treatment results reviewed;care plan/treatment goals reviewed;risks/benefits reviewed;current/potential barriers reviewed;participants voiced agreement with care plan;participants included;patient   PT Total Evaluation Time   PT Eval, Low Complexity Minutes (92220) 10   PT Discharge Planning   PT Plan Progress independence, increase ambulation distance, general strengthening   PT Discharge Recommendation (DC Rec) Transitional Care Facility   PT Rationale for DC Rec At baseline, pt lives alone in an apartment with 6 steps to enter. Pt reports independence with mobility, however, several falls over past 3 months. This date, t is well below baseline and would be a considerable fall risk if he returned to home environment. Pt currently requires assist with all mobility and has decreased activity tolerance. Pt will benefit from continued therapy at TCU to address impairments and increase mobility and functional independence prior to returning home.   PT Brief overview of current status Assist of 1, decreased activity tolerance   Total Session Time   Total Session Time (sum of timed and untimed services) 10

## 2024-01-21 NOTE — PROGRESS NOTES
Inpatient Cardiology Consultation Progress Note:    Parviz Yip MRN#: [id number]   YOB: 1940 Age: 83 year old     Date of Admission: 1/19/2024  Consult indication: atrial fibrillation, fall         Assessment and Plan:     # Paroxysmal atrial fibrillation, intermittent sinus rhythm with first degree AVB and PACs.  New diagnosis. Asymptomatic. Auto rate controlled. [id number] 3. Falls about 3 times month.  # Elevated troponin, mild, no angina, no acute ischemic changes on ECG, clinical presentation most consistent with demand ischemia secondary to rhabdomyolysis  # Confusion, possible cognitive impairment.  Initially alert and oriented to person and time only, and not place (could not name [place] as the state he is currently in until reminded).  Otherwise he seemed to answer questions appropriately.    -Discussed anticoagulation for the prevention of stroke with patient, benefits of stroke prevention waiting as risks of bleeding, he does have anemia, as well as frequent falls, he was admitted with a fall.  Patient was undecided regarding initiation of therapy.  Discussed with patient's nephew (over phone, lives in [place] but is quite involved caring for patient, he is the patient's only living relative), who is the healthcare power of , after an in-depth discussion, decision was made to hold off on anticoagulation as it was felt that risks likely outweigh benefits.  Discussed with patient who was in agreement, they would like to pursue a conservative strategy with respect to healthcare, understanding risks of stroke, plan to move patient into an assisted living facility  - Resume aspirin  - Discharge with cardiac monitor  - Follow-up in cardiology clinic ordered in discharge navigator      Thank you for allowing our team to participate in the care of Parviz Yip.  Please do not hesitate to page me with any questions or concerns.     Flash Acosta MD, [facility]   Cardiology  January 21, 2024    Voice recognition software utilized.     High complexity          Interval Events:     - remains in paroxysmal atrial fibrillation, with intermittent sinus with 2nd degree type 1 AV block, sinus with 1st degree AVB and PACs, no significant pauses >3s on telemetry, rates 60 bpm range  - asymptomatic         Medications reviewed:     Current medications:  Current Facility-Administered Medications Ordered in Epic   Medication Dose Route Frequency Last Rate Last Admin    acetaminophen (TYLENOL) tablet 650 mg  650 mg Oral Q4H PRN   650 mg at 01/20/24 1819    Or    acetaminophen (TYLENOL) Suppository 650 mg  650 mg Rectal Q4H PRN        calcium carbonate (TUMS) chewable tablet 1,000 mg  1,000 mg Oral 4x Daily PRN        glucose gel 15-30 g  15-30 g Oral Q15 Min PRN        Or    dextrose 50 % injection 25-50 mL  25-50 mL Intravenous Q15 Min PRN        Or    glucagon injection 1 mg  1 mg Subcutaneous Q15 Min PRN        heparin infusion 25,000 units in D5W 250 mL ANTICOAGULANT  0-5,000 Units/hr Intravenous Continuous 10 mL/hr at 01/21/24 0500 1,000 Units/hr at 01/21/24 0500    insulin aspart (NovoLOG) injection (RAPID ACTING)  1-7 Units Subcutaneous TID AC        insulin aspart (NovoLOG) injection (RAPID ACTING)  1-5 Units Subcutaneous At Bedtime        levothyroxine (SYNTHROID/LEVOTHROID) tablet 75 mcg  75 mcg Oral QAM AC   75 mcg at 01/21/24 0636    lidocaine (LMX4) cream   Topical Q1H PRN        lidocaine 1 % 0.1-1 mL  0.1-1 mL Other Q1H PRN        Patient is already receiving anticoagulation with heparin, enoxaparin (LOVENOX), warfarin (COUMADIN)  or other anticoagulant medication   Does not apply Continuous PRN        senna-docusate (SENOKOT-S/PERICOLACE) 8.6-50 MG per tablet 1 tablet  1 tablet Oral BID PRN        Or    senna-docusate (SENOKOT-S/PERICOLACE) 8.6-50 MG per tablet 2 tablet  2 tablet Oral BID PRN        sodium chloride (PF) 0.9% PF flush 3 mL  3 mL Intracatheter Q8H         sodium chloride (PF) 0.9% PF flush 3 mL  3 mL Intracatheter q1 min prn        sodium chloride 0.9 % infusion   Intravenous Continuous 100 mL/hr at 24 2210 New Bag at 24 2210     No current [facility]-ordered outpatient medications on file.             Physical Exam:   Vital signs were reviewed:  Temperatures:  Current - Temp: 98.4  F (36.9  C); Max - Temp  Av.8  F (36.6  C)  Min: 97.3  F (36.3  C)  Max: 98.4  F (36.9  C)  Respiration range: Resp  Av.7  Min: 16  Max: 18  Pulse range: Pulse  Av  Min: 53  Max: 71  Blood pressure range: Systolic (24hrs), Av , Min:89 , Max:110   ; Diastolic (24hrs), Av, Min:49, Max:76    Pulse oximetry range: SpO2  Av.5 %  Min: 96 %  Max: 98 %    Intake/Output Summary (Last 24 hours) at 2024 0753  Last data filed at 2024 0245  Gross per 24 hour   Intake 1380 ml   Output 1450 ml   Net -70 ml     169 lbs 8 oz  Body mass index is 23.64 kg/m .   Body surface area is 1.96 meters squared.    General/Constitutional: appears stated age, in no apparent distress, appears to be well nourished  Head: normocephalic, atraumatic  Eyes - No scleral icterus  Neck: supple, trachea midline  Respiratory: clear to auscultation bilaterally, no wheezes, no rales, no increased work of breathing  Cardiovascular: JVP normal, regular rate, irregularly irregular rhythm, 1/6 BALDOMERO at the RUSB, no lower extremity edema  Gastrointestinal: no guarding, non-rigid   Neurologic: awake, alert, moves all extremities  Skin: no jaundice, warm on limited exam         Selected laboratory tests:   Laboratory test results personally reviewed:   [facility]  Recent Labs   Lab 24  0742 24  0418 24  0205 24  2103 24  1703 24  0713 24  1143   NA  --  136  --   --   --  138 140   POTASSIUM  --  4.3  --   --   --  4.1 4.4   CHLORIDE  --  104  --   --   --  106 103   CO2  --  26  --   --   --  26 26   ANIONGAP  --  6*  --   --   --  6* 11   * 106* 103*  "134*   < > 108* 112*   BUN  --  25.6*  --   --   --  25.9* 24.8*   CR  --  0.96  --   --   --  0.98 0.93   GFRESTIMATED  --  78  --   --   --  77 81   AKILA  --  8.1*  --   --   --  8.4* 9.3   MAG  --  2.1  --   --   --  1.8 1.6*   PHOS  --   --   --   --   --  2.9  --     < > = values in this interval not displayed.     CBC  Recent Labs   Lab 01/21/24  0419 01/20/24  0713 01/19/24  1143   WBC 5.9 6.6 9.5   RBC 3.22* 3.67* 4.31*   HGB 9.8* 10.9* 13.0*   HCT 29.0* 33.0* 38.7*   MCV 90 90 90   MCH 30.4 29.7 30.2   MCHC 33.8 33.0 33.6   RDW 13.6 13.5 13.4   * 166 190     INRNo lab results found in last 7 days.  Lab Results   Component Value Date    TROPI 0.054 (H) 05/24/2009    TROPI 0.029 05/23/2009    TROPI <0.012 05/21/2009     No results for input(s): \"CHOL\", \"HDL\", \"LDL\", \"TRIG\", \"CHOLHDLRATIO\" in the last 17333 hours.  Lab Results   Component Value Date    A1C 5.8 01/20/2024    A1C 6.3 05/21/2009     TSH   Date Value Ref Range Status   01/19/2024 2.74 0.30 - 4.20 uIU/mL Final       "

## 2024-01-21 NOTE — PROGRESS NOTES
[facility]    Medicine Progress Note - Hospitalist Service    Date of Admission:  1/19/2024    Assessment & Plan    Parviz Yip is a 83 year old male with past medical history significant for Hypertension, Diabetes, asthma admitted on 1/19/2024 after an unwitnessed fall. He is found to have new atrial fibrillation as well as mild troponin elevation and mild CK elevation.      Unwitnessed Fall   Generalized weakness   Mild rhabdomyolysis   The patient presents to the ED after an unwitnessed fall at home. He was reportedly in his usual state of health day prior. He went to [place], and then visited some friends. When he got home he thinks he was making some thing to eat and then somehow ended up falling. He does not actually recall how he fell, but thinks he must have tripped or lost his balance. He states he did not hit his head or lost consciousness.   He was found on the ground in his apartment in the morning on day of admission by some friends after he did not show up for their regular breakfast date. He denied any dizziness. He denies any current pain. No injuries were identified in the ED. He does have a mild CK elevation consistent with some mild rhabdomyolysis.   *etiology of fall is not clear, could be dehydration. He is on antihypertensive PTA and BP has been, low normal range.    -  on 1/21  - continue IVF, discontinue at 6pm this evening  - f/up labs in am  - PT/OT--> OT eval rec TCU  - CC/SW      Probable underlying cognitive impairment  Concern for possible encephalopathy on admission  *see H&P  *MRI of brain- motion degraded exam without acute finding. Chronic age related changes--refer to report for detail  *on 1/20,Spoke with patient's nephew, Krish, over the phone. He lives out of state, but Krish is the only living relative patient has. Erinn reports that patient has been a [employer] [place] for about 16 years. Patient has been generally sharp until about few  months ago where he noted some decline in his memory. [place] and left about 3 or 4 days ago. They have started looking for [facility] facility for patient where he could get more assistance than his current living situation where he lives alone in an apartment.  Krish reports to me that he has spoke to patient earlier today and felt he was about his baseline. Krish states patient has been forgetful and as the day goes on he gets tired.   On 1/20-patient is alert and oriented. Appropriately answers questions. However, he still not sure why he fell.  - monitor for delirium  - maintain sleep/wake cycle  - care management following for discharge planning    New onset atrial fibrillation, paroxysmal  EKG in the ED showed atrial fibrillation. I cannot see any history of atrial fibrillation in the chart; however it looks like he gets his primary care at [facility] which I do not see records for. The patient is unaware of a prior diagnosis of atrial fibrillation  *Rate is currently controlled without meds  * TTE LV 55-60%, no wall motion abnormality   *cardiology spoke with patient regarding anticoagulation risk of bleeding with his falls/benefit of stroke prevention, refer to cardiology note for detail. Patient undecided about that. Not interested in VIELKA occulusion. Cardiology also discussed with patient's nephew regarding anticoagulation. Decision was made for conservative approached and resuming PTA ASA only.   - heparin discontinued. Patient placed back on ASA 81mg daily  - plan for Zio-patch at discharge and cardiology follow up   - Cardiac monitoring   - appreciate cardiology assistance     Mild troponin elevation   Troponin is elevated to 26--29-39-39*on admission. Suspect some demand ischemia in the setting of a fib, rhabdo. Patient without chest pain. Per cardiology, holding off ischemic evaluation at this time and could be considered as outpatient.     Microscopic  hematuria   Bladder outlet obstruction and Mild R sided hydronephrosis   Urinary retention  Noted on CT scan.   *needed straight cath on admission and additionally again last night  - Mccrary to be inserted today on 1/21  - urology consult  - not starting flomax at this time due to low normal blood pressure     Mild age indeterminate T12 vertebral compression fracture  Noted on CT scan. Pt currently denies any pain   - Monitor      Hypomagnesemia  - Replace per protocol      Bilateral adrenal nodules   Incidental finding on CT scan   - Recommend outpatient monitoring      Hypothyroidism  - Resume PTA levothyroxine  - TSH normal range    Right foot erythema-improved  This is noted on 1/19-1/20 overnight. Evaluated by cross cover MD. US LE ordered and negative for DVT. Cellulitis was felt less likely based on exam  -1/20- he has mild erythema on right foot, no warmth, DP pulse present. No pain/tenderness. Able to walk on it.   - monitor, low suspicion for cellulitis. Elevate leg  -1/21- erythema seems to have much improved    HTN  PTA on Losartan and hydrochlorothiazide  - BP is low normal at this time.   - hold antihypertensives    Type II DM  No recent A1c in the system. PTA on Metformin  - sliding scale insulin ac and hs  - monitor for hypoglycemia    Anemia  Hgb 13 on admission. No recent labs available  - hgb down trended 13->10.9>9.8.   - he does have microscopic hematuria, so far no gross hematuria reported. Will monitor when mccrary is inserted. Again this could be dilutional component from IVF as well  - monitor             Diet: Combination Diet Regular Diet Adult  Room Service  Snacks/Supplements Adult: Magic Cup; With Meals    DVT Prophylaxis: heparin gtt as above  Mccrary Catheter: Not present  Lines: None     Cardiac Monitoring: ACTIVE order. Indication: Tachyarrhythmias, acute (48 hours)  Code Status: No CPR- Do NOT Intubate      Clinically Significant Risk Factors                                     Disposition Plan  likely in 1-2 days to TCU    Expected Discharge Date: 01/22/2024                    Jewel Bansal MD  Hospitalist Service  [facility]  Securely message with "[employer] (more info)  Text page via [employer] Paging/Directory   ______________________________________________________________________    Interval History   Patient reports he is doing good today. Reports some pain intermittently in bilateral shins. Denies pain in his feet. Denies nausea, vomiting, chest pain or shortness of breath. He is afebrile.     Physical Exam   Vital Signs: Temp: 97.7  F (36.5  C) Temp src: Oral BP: 100/58 Pulse: 67   Resp: 18 SpO2: 96 % O2 Device: None (Room air)    Weight: 169 lbs 8 oz    General Appearance: Alert, awake and no apparent distress  Respiratory: clear to auscultation bilaterally, no wheezing  Cardiovascular: regular rate and rhythm  GI: soft and non-tender  Skin: right foot marking noted, no erythema, non-tender      Medical Decision Making       40 MINUTES SPENT BY ME on the date of service doing chart review, history, exam, documentation & further activities per the note.  MANAGEMENT DISCUSSED with the following over the past 24 hours: patient, nursing staff, care management   NOTE(S)/MEDICAL RECORDS REVIEWED over the past 24 hours: nursing notes, cardiology note  Tests ORDERED & REVIEWED in the past 24 hours:  - BMP  - CBC  - Magnesium  - CK       Data     I have personally reviewed the following data over the past 24 hrs:    5.9  \   9.8 (L)   / 147 (L)     136 104 25.6 (H) /  100 (H)   4.3 26 0.96 \     TSH: N/A T4: N/A A1C: N/A       Imaging results reviewed over the past 24 hrs:   Recent Results (from the past 24 hour(s))   Echocardiogram Complete   Result Value    LVEF  55-60%    Narrative    [id number]  [id number]  [id number]  [id number]^LIU^SHELBY^A     [facility]  Echocardiography Laboratory  [address] [place] [address]     Name:  KAREN GENAO  MRN: [id number]  : 1940  Study Date: 2024 10:38 AM  Age: 83 yrs  Gender: Male  Patient Location: [facility]  Reason For Study: Atrial Fibrillation  Ordering Physician: SHELBY LIU  Referring Physician: Cam Colunga  Performed By: Tigist Rose FRANKLIN     BSA: 2.0 m2  Height: 71 in  Weight: 180 lb  HR: 85  BP: 110/75 mmHg  ______________________________________________________________________________  Procedure  Complete Portable Echo Adult.  ______________________________________________________________________________  Interpretation Summary     Sinus rhythm was noted.  The patient exhibited occasional PACs.  Left ventricular systolic function is normal.  The visual ejection fraction is 55-60%.  The left ventricle is normal in size.  The right ventricle is normal in structure, function and size.  There is trace mitral regurgitation.     The pericardial effusion seen on the last study 2009 has resolved. The  study is otherwise unremarkable except for premature atrial contractions.  ______________________________________________________________________________  Left Ventricle  The left ventricle is normal in size. There is normal left ventricular wall  thickness. Left ventricular systolic function is normal. The visual ejection  fraction is 55-60%. Left ventricular diastolic function is indeterminate. No  regional wall motion abnormalities noted. There is no thrombus seen in the  left ventricle.     Right Ventricle  The right ventricle is normal in structure, function and size. There is no  mass or thrombus in the right ventricle.     Atria  Normal left atrial size. Right atrial size is normal. There is no atrial shunt  seen. The left atrial appendage is not well visualized.     Mitral Valve  The mitral valve leaflets appear normal. There is no evidence of stenosis,  fluttering, or prolapse. There is trace mitral regurgitation. There is no  mitral valve stenosis.      Tricuspid Valve  Normal tricuspid valve. The right ventricular systolic pressure is  approximated at 28.5 mmHg plus the right atrial pressure. Right ventricle  systolic pressure estimate normal. There is mild (1+) tricuspid regurgitation.  There is no tricuspid stenosis.     Aortic Valve  There is mild trileaflet aortic sclerosis. No aortic regurgitation is present.  No aortic stenosis is present.     Pulmonic Valve  Normal pulmonic valve. There is no pulmonic valvular regurgitation. There is  no pulmonic valvular stenosis.     Vessels  The aortic root is normal size. Normal size ascending aorta. The inferior vena  cava is normal. The pulmonary artery is normal size.     Pericardium  The pericardium appears normal. There is no pleural effusion.     Rhythm  Sinus rhythm was noted. The patient exhibited occasional PACs.  ______________________________________________________________________________  MMode/2D Measurements & Calculations  IVSd: 0.82 cm     LVIDd: 3.7 cm  LVIDs: 2.3 cm  LVPWd: 0.87 cm  FS: 38.4 %  LV mass(C)d: 86.4 grams  LV mass(C)dI: 42.8 grams/m2  Ao root diam: 3.2 cm  Ao root diam index Ht(cm/m): 1.8  Ao root diam index BSA (cm/m2): 1.6  LA Volume (BP): 33.2 ml  LA Volume Index (BP): 16.4 ml/m2  RWT: 0.48  TAPSE: 1.8 cm     Doppler Measurements & Calculations  MV E max mingo: 61.6 cm/sec  MV A max mingo: 66.9 cm/sec  MV E/A: 0.92  MV dec time: 0.26 sec  PA acc time: 0.07 sec  TR max mingo: 267.0 cm/sec  TR max P.5 mmHg  E/E' av.5  Lateral E/e': 3.1  Medial E/e': 5.8  RV S Imngo: 11.2 cm/sec     ______________________________________________________________________________  Report approved by: Dr. Jabari Espinoza 2024 12:57 PM

## 2024-01-21 NOTE — PROGRESS NOTES
Pt A&O X 4. VSS on RA. Tele Afib CVR. Regular diet, thin liquids. Takes pills whole. Up with assist of 1 GB/W. Denies pain. Bladder scanned pt for over 1000 ml late morning. Updated MD. MD ordered urology consult and indwelling mccrary catheter. Mccrary catheter placed, pt tolerated well. NS running 100 ml/hr continuous. Writer noted soft BP in afternoon. Updated MD. MD ordered one time order 500ml NS bolus. Pt scoring green on the Aggression Stop Light Tool. Will continue plan of care. Discharge pending.

## 2024-01-22 NOTE — PROGRESS NOTES
Care Management Follow Up    Length of Stay (days): 3    Expected Discharge Date: 01/24/2024     Concerns to be Addressed:       Patient plan of care discussed at interdisciplinary rounds: Yes    Anticipated Discharge Disposition: Transitional Care     Anticipated Discharge Services: Transportation Services  Anticipated Discharge DME:      Patient/family educated on Medicare website which has current facility and service quality ratings:    Education Provided on the Discharge Plan:    Patient/Family in Agreement with the Plan: yes    Referrals Placed by CM/SW:    Private pay costs discussed: Not applicable    Additional Information:  Writer received call from Estella in admissions at [place] stating they can clinically accept patient into a TCU bed and have one available tomorrow 1/23 into a shared room.   Writer informed patient of this, binh Rutherford ([facility]) and Lillian ([facility]) and all are in agreement.   Patient's friends Josh and Cyngwen were present at bedside and will provide wheelchair transport for patient via skyway to [place] at 1500.   Care team aware and all in agreement.   Per Estella, [facility] has waived insurance authorization through March therefore they do not need to seek auth prior to admission.   SW to send orders and PAS tomorrow upon completion.     VALERY Lizarraga, LGSW    [facility]

## 2024-01-22 NOTE — CONSULTS
Patient has Medicare Advantage through [employer].    Xarelto/Eliquis  --$47/mo  --lf/when total drug costs exceed $5,030, price will increase to a 25% coinsurance, equivalent to $141/mo.    Yaquelin Watkins  Pharmacy Technician/Liaison, Discharge Pharmacy   [phone] (voice or text)  [email or web address]

## 2024-01-22 NOTE — CONSULTS
Urology Consult - 1/21/24    Name: Parviz Yip    MRN: [id number]   YOB: 1940               Chief Complaint:   Urinary Retention    History is obtained from the patient and chart review          History of Present Illness:   Parviz Yip is a 83 year old male with HTN, DM, asthma who was admitted after an unwitnessed fall. Urology is consulted for urinary retention.    Prior to admission he felt he was urinating without issue. He felt he was emptying his bladder well, no urinary hesitancy or straining to urinate. No pain or burning with urination. He does have nocturia, usually once at night. He has not had any flank pain.    He did have an episode of hematuria while here. He has never had a urinary tract infection. He has never had kidney stones. He has never seen a urologist before. He has no history of any prior abdominal surgeries.     He is currently afebrile, vitally stable. Cr is within normal limits. UA was nitrite and LE negative but did have microscopic hematuria. He has been catheterized for volumes >1 L while here          Past Medical History:     Past Medical History:   Diagnosis Date    Diabetes (H)     Hypertension     Uncomplicated asthma           Past Surgical History:     Past Surgical History:   Procedure Laterality Date    SOFT TISSUE SURGERY            Social History:     Social History     Tobacco Use    Smoking status: Never    Smokeless tobacco: Never   Substance Use Topics    Alcohol use: Never          Medications:     Current Facility-Administered Medications   Medication    acetaminophen (TYLENOL) tablet 650 mg    Or    acetaminophen (TYLENOL) Suppository 650 mg    aspirin EC tablet 81 mg    calcium carbonate (TUMS) chewable tablet 1,000 mg    glucose gel 15-30 g    Or    dextrose 50 % injection 25-50 mL    Or    glucagon injection 1 mg    insulin aspart (NovoLOG) injection (RAPID ACTING)    insulin aspart (NovoLOG) injection (RAPID ACTING)    levothyroxine  "(SYNTHROID/LEVOTHROID) tablet 75 mcg    lidocaine (LMX4) cream    lidocaine 1 % 0.1-1 mL    Patient is already receiving anticoagulation with heparin, enoxaparin (LOVENOX), warfarin (COUMADIN)  or other anticoagulant medication    senna-docusate (SENOKOT-S/PERICOLACE) 8.6-50 MG per tablet 1 tablet    Or    senna-docusate (SENOKOT-S/PERICOLACE) 8.6-50 MG per tablet 2 tablet    sodium chloride (PF) 0.9% PF flush 3 mL    sodium chloride (PF) 0.9% PF flush 3 mL            Physical Exam:   VS:  T: 98    HR: 59    BP: 91/46    RR: 18   GEN:  Awake, responds appropriately to questions  CV:  Hemodynamically stable, no cyanosis  LUNGS: Non-labored breathing.   BACK:  No CVA tenderness.  ABD:  Soft.  NT.  ND.    :  Pineda draining clear yellow urine  SKIN:  Warm.  Dry.  No rashes.  NEURO:  CN grossly intact.            Data:   All laboratory data reviewed:    Recent Labs   Lab 01/21/24  0419 01/20/24  0713 01/19/24  1143   WBC 5.9 6.6 9.5   HGB 9.8* 10.9* 13.0*   * 166 190       Recent Labs   Lab 01/21/24  1722 01/21/24  1224 01/21/24  0742 01/21/24  0418 01/20/24  1703 01/20/24  0713 01/19/24  1143   NA  --   --   --  136  --  138 140   POTASSIUM  --   --   --  4.3  --  4.1 4.4   CHLORIDE  --   --   --  104  --  106 103   CO2  --   --   --  26  --  26 26   BUN  --   --   --  25.6*  --  25.9* 24.8*   CR  --   --   --  0.96  --  0.98 0.93   * 113* 100* 106*   < > 108* 112*   AKILA  --   --   --  8.1*  --  8.4* 9.3   MAG  --   --   --  2.1  --  1.8 1.6*   PHOS  --   --   --   --   --  2.9  --     < > = values in this interval not displayed.       Recent Labs   Lab 01/19/24  1210   COLOR Yellow   APPEARANCE Slightly Cloudy*   URINEGLC Negative   URINEBILI Negative   URINEKETONE Negative   SG 1.020   URINEPH 5.0   PROTEIN 20*   NITRITE Negative   LEUKEST Negative   RBCU >182*   WBCU 9*     CT A/P:  \"IMPRESSION:   1.  Mild right-sided hydronephrosis is present. Urinary bladder  appears distended with multiple " "diverticula. Constellation of findings  are suggestive of bladder outlet obstruction given enlarged prostate  gland.  2.  Mild age-indeterminate compression deformity at the T12 vertebral  level. Further characterization with spinal imaging may be considered.  3.  Indeterminate bilateral adrenal nodules measuring up to 1.0 cm.\"             Impression and Plan:   Impression:    83 year old male with above mentioned history, noted to have urinary retention while here as well as episode of gross hematuria, UA not overly concerning for infection and with stable Cr, CT scan with significant prostatic hypertrophy, distended bladder and hydronephrosis on the right, unable to fully characterize/assess for stones given contrast opacification of collecting system, but suspected due to bladder outlet obstruction.    Given significant retention, recommend continue indwelling catheter for bladder rest after suspected bladder stretch injury. He will also need outpatient follow-up for symptom check and to discuss additional evaluation for BPH and hematuria that was noted    Plan:    Continue mccrary catheter to allow for bladder rest  - UA/UC not concerning for infection  - Given presentation to hospital was in setting of fall, would avoid tamsulosin at this time  - Treat constipation aggressively  Minimize narcotics, anticholinergics that may exacerbate urinary retention  - Timing and location of trial of void pending patients length of stay in hospital, dispo plan  - Urology will follow    Rodo Rivera MD  Urology Resident, PGY-5            "

## 2024-01-22 NOTE — PLAN OF CARE
Pleasant gentlemen. Pt aox4, A1 gbw, RA and DNR/DNI. Tele afib cvr. Pt denies chest pain and SOB. Pineda in place for rentention. Neuro's intact.   Plan: Urology following and awaiting TCU placement.

## 2024-01-22 NOTE — PROGRESS NOTES
[facility] Urology Progress Note          Assessment and Plan:       Obstruction to urinary outflow    Traumatic rhabdomyolysis, initial encounter (H24)    Syncope, unspecified syncope type    Hematuria, unspecified type    Bladder stones    Assessment: Hematuria, resolved    Plan: Pineda for bladder rest (at least one wk, favor 2wks).   Consider finasteride 5mg daily for BPH (Flomax has hypotension risk and would be risky to start given his recent syncope).  Will arrange for TOV and cysto (eval bladder stones, CARTER) in our office.   Will sign off. Please call if questions.    Dr. Cash updated.     Kellie Hill PA-C  [facility]  [phone]                   Interval History:     doing well. Pineda clear, tolerating well              Medications:     Current Facility-Administered Medications Ordered in Epic   Medication Dose Route Frequency Last Rate Last Admin    acetaminophen (TYLENOL) tablet 650 mg  650 mg Oral Q4H PRN   650 mg at 01/21/24 1843    Or    acetaminophen (TYLENOL) Suppository 650 mg  650 mg Rectal Q4H PRN        aspirin EC tablet 81 mg  81 mg Oral Daily   81 mg at 01/21/24 1322    calcium carbonate (TUMS) chewable tablet 1,000 mg  1,000 mg Oral 4x Daily PRN        glucose gel 15-30 g  15-30 g Oral Q15 Min PRN        Or    dextrose 50 % injection 25-50 mL  25-50 mL Intravenous Q15 Min PRN        Or    glucagon injection 1 mg  1 mg Subcutaneous Q15 Min PRN        insulin aspart (NovoLOG) injection (RAPID ACTING)  1-7 Units Subcutaneous TID AC        insulin aspart (NovoLOG) injection (RAPID ACTING)  1-5 Units Subcutaneous At Bedtime        levothyroxine (SYNTHROID/LEVOTHROID) tablet 75 mcg  75 mcg Oral QAM AC   75 mcg at 01/21/24 0636    lidocaine (LMX4) cream   Topical Q1H PRN        lidocaine 1 % 0.1-1 mL  0.1-1 mL Other Q1H PRN        Patient is already receiving anticoagulation with heparin, enoxaparin (LOVENOX), warfarin (COUMADIN)  or other anticoagulant medication   Does  not apply Continuous PRN        senna-docusate (SENOKOT-S/PERICOLACE) 8.6-50 MG per tablet 1 tablet  1 tablet Oral BID PRN        Or    senna-docusate (SENOKOT-S/PERICOLACE) 8.6-50 MG per tablet 2 tablet  2 tablet Oral BID PRN        sodium chloride (PF) 0.9% PF flush 3 mL  3 mL Intracatheter Q8H   3 mL at 01/22/24 0605    sodium chloride (PF) 0.9% PF flush 3 mL  3 mL Intracatheter q1 min prn         No current [facility]-ordered outpatient medications on file.                  Physical Exam:   Vitals were reviewed  Patient Vitals for the past 8 hrs:   BP Temp Temp src Pulse Resp SpO2 Weight   01/22/24 0748 110/52 98.3  F (36.8  C) Oral 51 22 95 % --   01/22/24 0550 112/58 -- -- 64 18 97 % 77.1 kg (170 lb)     GEN: NAD, eating breakfast  EYES: EOMI  : Clear             Data:     Lab Results   Component Value Date    NTBNPI 2070 (H) 05/23/2009    NTBNPI 363 05/20/2009     Lab Results   Component Value Date    WBC 5.9 01/22/2024    WBC 5.9 01/21/2024    WBC 6.6 01/20/2024    HGB 9.7 (L) 01/22/2024    HGB 9.8 (L) 01/21/2024    HGB 10.9 (L) 01/20/2024    HCT 29.0 (L) 01/22/2024    HCT 29.0 (L) 01/21/2024    HCT 33.0 (L) 01/20/2024    MCV 90 01/22/2024    MCV 90 01/21/2024    MCV 90 01/20/2024     01/22/2024     (L) 01/21/2024     01/20/2024     Lab Results   Component Value Date    INR 1.16 (H) 05/22/2009

## 2024-01-22 NOTE — CONSULTS
Care Management Initial Consult    General Information  Assessment completed with: Family, Sierra Rutherford  Type of CM/SW Visit: Initial Assessment    Primary Care Provider verified and updated as needed: Yes   Readmission within the last 30 days:        Reason for Consult: discharge planning  Advance Care Planning: Advance Care Planning Reviewed: other (see comments) (No acp documents on file)          Communication Assessment  Patient's communication style: spoken language (English or Bilingual)    Hearing Difficulty or Deaf: no   Wear Glasses or Blind: yes    Cognitive  Cognitive/Neuro/Behavioral: WDL  Level of Consciousness: alert  Arousal Level: opens eyes spontaneously  Orientation: oriented x 4  Mood/Behavior: calm  Best Language: 0 - No aphasia  Speech: clear    Living Environment:   People in home: alone     Current living Arrangements: apartment      Able to return to prior arrangements: other (see comments) (Recommending TCU and family/POA looking for [facility] - will not return to apartment)       Family/Social Support:  Care provided by: self  Provides care for: no one  Marital Status: Single  Other (specify) (Nephpattie Rutherford closest family member)          Description of Support System: Involved, Supportive    Support Assessment: Adequate family and caregiver support, Adequate social supports    Current Resources:   Patient receiving home care services:       Community Resources:    Equipment currently used at home: none  Supplies currently used at home:      Employment/Financial:  Employment Status: retired        Financial Concerns: none   Referral to Financial Worker: No  Finance Comments: Camden Nelson    Does the patient's insurance plan have a 3 day qualifying hospital stay waiver?  Yes     Which insurance plan 3 day waiver is available? [employer]    Will the waiver be used for post-acute placement? Undetermined at this time    Lifestyle & Psychosocial Needs:  Social Determinants of Health     Food  Insecurity: Not on file   Depression: Not on file   Housing Stability: Not on file   Tobacco Use: Low Risk  (12/23/2021)    Patient History     Smoking Tobacco Use: Never     Smokeless Tobacco Use: Never     Passive Exposure: Not on file   Financial Resource Strain: Not on file   Alcohol Use: Not on file   Transportation Needs: Not on file   Physical Activity: Not on file   Interpersonal Safety: Not on file   Stress: Not on file   Social Connections: Not on file       Functional Status:  Prior to admission patient needed assistance:              Mental Health Status:          Chemical Dependency Status:                Values/Beliefs:  Spiritual, Cultural Beliefs, Anabaptism Practices, Values that affect care: no               Additional Information:  Per CM/SW consult for discharge planning, writer reviewed chart. Patient admitted on 1/19/24 to the ED via EMS for evaluation of generalized weakness. Was found on the floor by friends which prompted the EMS call. EMS noted patient to be in A fib.   Writer placed call to patient's nephew Krish due to reports of patient exhibiting increased confusion over the weekend. Krish reports patient lives alone in an apartment with stairs, was independent at baseline, driving self, but that he has noticed a cognitive decline in patient over the last 3 months. Krish reports patient has never been , does not have children, and that as the nephew, he is his closest family member/support. Krish also reports that someone by the name of Lillian is the fiduciary POA (d/t closest proximity to patient) but that he is second and assist with health care decisions/has been involved in all of his care. Krish states they had suggested to patient to move out of his current apartment and into an [facility] in which patient was in agreement with. Krish is currently boarding flight to go out of the country but requested for writer to email him with recommendations to allow for communication to occur  while out of the country as well as including the MATHEW [place].  Writer was able to review TCU recommendations at discharge from hospital in which Krish was in agreement with and felt appropriate. Writer educated role of getting patient to TCU but that once there the SW could assist with the later steps of seeking for an [facility] should that be the end goal - Krish understood. Writer educated that patient has [facility] insurance therefore would need to select from a limited [facility] accepting list.   Writer emailed Krish and included SW that will follow tomorrow with above information per request from Krish. Will send TCU referrals upon response from binh.    Writer received HCD and sent to honoring choices to be placed on patient's chart.      Addendum 1437: Writer received email back from binh Rutherford and Lillian to send TCU referrals to [facility], and [facility]. Writer sent via [facility] for bed availability.   Patient is medically stable for discharge.     Will continue to follow.    VALERY Lizarraga, LGSW    [facility]

## 2024-01-22 NOTE — PROGRESS NOTES
[facility]    Medicine Progress Note - Hospitalist Service    Date of Admission:  1/19/2024    Assessment & Plan    Parviz Yip is a 83 year old male with past medical history significant for Hypertension, Diabetes, asthma admitted on 1/19/2024 after an unwitnessed fall. He is found to have new atrial fibrillation as well as mild troponin elevation and mild CK elevation.      Unwitnessed Fall   Generalized weakness   Mild rhabdomyolysis   The patient presents to the ED after an unwitnessed fall at home. He was reportedly in his usual state of health day prior. He went to [employer], and then visited some friends. When he got home he thinks he was making some thing to eat and then somehow ended up falling. He does not actually recall how he fell, but thinks he must have tripped or lost his balance. He states he did not hit his head or lost consciousness.   He was found on the ground in his apartment in the morning on day of admission by some friends after he did not show up for their regular breakfast date. He denied any dizziness. He denies any current pain. No injuries were identified in the ED. He does have a mild CK elevation (755 on admission) consistent with some mild rhabdomyolysis.   *etiology of fall is not clear, could be dehydration. He is on antihypertensive PTA and BP has been, low normal range.    - IVF discontinued on 1/21  -  on 1/22  - encourage PO hydration  - PT/OT--> OT eval rec TCU  - CC/SW for discharge planning     Probable underlying cognitive impairment  Concern for possible encephalopathy on admission  *see H&P  *MRI of brain- motion degraded exam without acute finding. Chronic age related changes--refer to report for detail  *on 1/20,Spoke with patient's nephew, Krish, over the phone. He lives out of state, but Krish is the only living relative patient has. Erinn reports that patient has been a [employer] [place] for about 16 years. Patient has been generally  bel until about few months ago where he noted some decline in his memory. Krish was in [place] visiting patient and left about 3 or 4 days ago. They have started looking for intermediate facility for patient where he could get more assistance than his current living situation where he lives alone in an apartment.  Krish reports to me that he has spoke to patient earlier today and felt he was about his baseline. Krish states patient has been forgetful and as the day goes on he gets tired.   *he has been alert, oriented and appropriately responding to questions as of 1/20-1/21  - monitor for delirium  - maintain sleep/wake cycle  - care management following for discharge planning    New onset atrial fibrillation, paroxysmal  EKG in the ED showed atrial fibrillation. I cannot see any history of atrial fibrillation in the chart; however it looks like he gets his primary care at [facility] which I do not see records for. The patient is unaware of a prior diagnosis of atrial fibrillation  *Rate is currently controlled without meds  * TTE LV 55-60%, no wall motion abnormality   *cardiology spoke with patient  also patent's nephew regarding anticoagulation risk of bleeding with his falls/benefit of stroke prevention, refer to cardiology note for detail. Ultimately, decision was made for conservative approached and resuming PTA ASA only.   - heparin discontinued on 1/21. Patient placed back on ASA 81mg daily  - plan for Zio-patch at discharge and cardiology follow up   - tele monitoring  - appreciate cardiology assistance, signed off on 1/22     Mild troponin elevation   Troponin is elevated to 26--29-39-39*on admission. Suspect some demand ischemia in the setting of a fib, rhabdo. Patient without chest pain. Per cardiology, holding off ischemic evaluation at this time and could be considered as outpatient.     Microscopic hematuria   Bladder outlet obstruction and Mild R sided hydronephrosis   Urinary  retention  Noted on CT scan.   *needed straight cath on admission and additionally after admission  - Mccrary to be inserted today on 1/21  - urology consulted, appreciate assistance--> recommend continuing mccrary for ~2 weeks, will arrange TOV and cysto in office. Signed off on 1/22  - not starting flomax at this time due to low normal blood pressure     Mild age indeterminate T12 vertebral compression fracture  Noted on CT scan. Pt currently denies any pain   - Monitor      Hypomagnesemia: resolved  S/p replacement     Bilateral adrenal nodules   Incidental finding on CT scan   - Recommend outpatient monitoring      Hypothyroidism  - Resume PTA levothyroxine  - TSH normal range    Right foot erythema-improved  This is noted on 1/19-1/20 overnight. Evaluated by cross cover MD. US LE ordered and negative for DVT. Cellulitis was felt less likely based on exam  -1/20- he has mild erythema on right foot, no warmth, DP pulse present. No pain/tenderness. Able to walk on it.   - monitor, low suspicion for cellulitis. Elevate leg  -1/21- erythema seems to have much improved/resolved    HTN  PTA on Losartan and hydrochlorothiazide  - BP is low normal at this time.   - hold antihypertensives, currently no plan to resume at discharge unless bp elevated.    Type II DM  No recent A1c in the system. PTA on Metformin  - BG has been below threshold for sliding scale insulin. Discontinue sliding scale insulin  - Resume PTA Metformin BID  - change BG checks to BID  - monitor for hypoglycemia    Anemia  Hgb 13 on admission. No recent labs available  - hgb down trended 13->10.9>9.8, stable at 9.7g/dL on 1/22  - he does have microscopic hematuria, so far no gross hematuria. Suspect component of dilution from IVF    - monitor             Diet: Combination Diet Regular Diet Adult  Room Service  Snacks/Supplements Adult: Magic Cup; With Meals    DVT Prophylaxis: heparin gtt as above  Mccrary Catheter: PRESENT, indication:    Lines: None      Cardiac Monitoring: ACTIVE order. Indication: Tachyarrhythmias, acute (48 hours)  Code Status: No CPR- Do NOT Intubate      Clinically Significant Risk Factors                             # Financial/Environmental Concerns: none         Disposition Plan  he is now medically ready to discharge when TCU bed available     Expected Discharge Date: 01/24/2024                    Jewel Bansal MD  Hospitalist Service  [facility]  Securely message with [employer] (more info)  Text page via [employer] Paging/Directory   ______________________________________________________________________    Interval History   Patient reports he is doing good today. States therapy went well.   Denies n/v or abdominal pain. He is afebrile. States he is drinking well and hydrating himself    Physical Exam   Vital Signs: Temp: 98.3  F (36.8  C) Temp src: Oral BP: 110/52 Pulse: 51   Resp: 22 SpO2: 95 % O2 Device: None (Room air)    Weight: 170 lbs 0 oz    General Appearance: Alert, awake and no apparent distress  Respiratory: clear to auscultation bilaterally, no wheezing  Cardiovascular: regular rate and rhythm  GI: soft and non-tender  Skin: right foot marking noted, no erythema, non-tender      Medical Decision Making       45 MINUTES SPENT BY ME on the date of service doing chart review, history, exam, documentation & further activities per the note.  MANAGEMENT DISCUSSED with the following over the past 24 hours: patient, nursing staff, care management   NOTE(S)/MEDICAL RECORDS REVIEWED over the past 24 hours: nursing notes, urology note  Tests ORDERED & REVIEWED in the past 24 hours:  - BMP  - CBC  - CK, glucose       Data     I have personally reviewed the following data over the past 24 hrs:    5.9  \   9.7 (L)   / 162     140 109 (H) 20.1 /  93   4.4 24 0.91 \       Imaging results reviewed over the past 24 hrs:   No results found for this or any previous visit (from the past 24 hour(s)).

## 2024-01-22 NOTE — CONSULTS
"[facility] Nurse Inpatient Assessment     Consulted for: Coccyx, perineum    Summary: Pt has minor friction injury to Right buttock that currently has a superficial scab to the area. No s/sx of infection. Perineum reddened with no s/sx of infection    Patient History (according to provider note(s):    Parviz Yip is a 83 year old male with past medical history significant for Hypertension, Diabetes, asthma admitted on 1/19/2024 after an unwitnessed fall. He is found to have new atrial fibrillation as well as mild troponin elevation and mild CK elevation.      Assessment:      Areas visualized during today's visit: Perineal area and Sacrum/coccyx    Wound location: Right buttock/inner gluteal cleft    Last photo: 1/22/24    Wound due to: Friction  Wound history/plan of care: Mirror image erythema to BL buttock. Pt reports sometimes he feels like his bottom rubs. Small scabbed area to right buttock   Wound base: 100 % superficial scab     Palpation of the wound bed: normal      Drainage: none     Description of drainage: none     Measurements (length x width x depth, in cm): 0.4  x 0.4  x  0 cm      Tunneling: N/A     Undermining: N/A  Periwound skin: Intact and Erythema- blanchable      Color: red      Temperature: normal   Odor: none  Pain: denies , none  Pain interventions prior to dressing change: N/A  Treatment goal: Heal  and Protection  STATUS: initial assessment  Supplies ordered: gathered, at bedside, supplies stored on unit, and discussed with RN       Treatment Plan:     BL buttock wound(s): Every 3 days  1. Clean wound with saline or MicroKlenz Spray, pat dry  2. Wipe / \"clean\" the surrounding periwound tissue with skin prep (Cavilon No Sting Skin Prep #[id number]) and allow to dry. This will help protect periwound and help dressing adherence  3. Press a Mepilex  Sacral Dressing (PS#[id number])  to the area, making sure to conform nicely to skin curvatures  - begin placing the " "Mepilex \"upside down\"   - as high up along the gluteal cleft or buttocks as you can  - place the most distal aspect of the dressing onto skin then smooth into place in an upward motion, then side to side.   to the area, making sure to conform nicely to skin curvatures.   4. Time and date dressing change  NOTE  -Reposition pt side to side [place] when in bed, every 2 hours-get the pt way over on side to completely offload pressure. This will benefit skin and respiratory function   -Keep heels elevated and floating on pillows at all times. Try using at least 2 pillows under each calf.  -When up to the chair pt needs to fully offload every 2 hours and use a chair cushion if needed        Orders: Written    RECOMMEND PRIMARY TEAM ORDER: None, at this time  Education provided: importance of repositioning, plan of care, wound progress, Moisture management, and Off-loading pressure  Discussed plan of care with: Patient and Nurse  WOC nurse follow-up plan: weekly  Notify WOC if wound(s) deteriorate.  Nursing to notify the Provider(s) and re-consult the WOC Nurse if new skin concern.    DATA:     Current support surface: Standard  Standard gel/foam mattress (IsoFlex, Atmos air, etc)  Containment of urine/stool: Incontinence Protocol  BMI: Body mass index is 23.71 kg/m .   Active diet order: Orders Placed This Encounter      Combination Diet Regular Diet Adult     Output: I/O last 3 completed shifts:  In: 240 [P.O.:240]  Out: 4000 [Urine:4000]     Labs:   Recent Labs   Lab 01/22/24  0616 01/21/24  0419 01/20/24  0713   HGB 9.7*   < > 10.9*   WBC 5.9   < > 6.6   A1C  --   --  5.8*    < > = values in this interval not displayed.     Pressure injury risk assessment:   Sensory Perception: 3-->slightly limited  Moisture: 3-->occasionally moist  Activity: 3-->walks occasionally  Mobility: 3-->slightly limited  Nutrition: 2-->probably inadequate  Friction and Shear: 2-->potential problem  Uriel Score: 16    Waldo Stearns RN " CWOCN  -Securely message with [employer] (more info) - can reach individually by name or search 'WOC Nurse' (Hemant) to reach all current WOCs on duty.  WOC Office Phone: [phone]

## 2024-01-23 NOTE — DISCHARGE SUMMARY
[facility]  Hospitalist Discharge Summary      Date of Admission:  1/19/2024  Date of Discharge:  1/23/2024  Discharging Provider: Jewel Bansal MD  Discharge Service: Hospitalist Service    Discharge Diagnoses   See below    Clinically Significant Risk Factors          Follow-ups Needed After Discharge   Follow-up Appointments     Follow Up and recommended labs and tests      1.Follow up with Nursing home physician.  The following labs/tests are   recommended: BMP, CK and CBC in 1 week.  2. Follow up with [facility] in 2 weeks for voiding trial and   cystoscopy. The office you will call you to arrange. If you don't receive   a call, Call [phone] to arrange.            Unresulted Labs Ordered in the Past 30 Days of this Admission       No orders found from 12/20/2023 to 1/20/2024.            Discharge Disposition   Discharged to short-term care facility  Condition at discharge: Stable    Hospital Course   Parviz Yip is a 83 year old male with past medical history significant for Hypertension, Diabetes, asthma admitted on 1/19/2024 after an unwitnessed fall. He is found to have new atrial fibrillation as well as mild troponin elevation and mild CK elevation.      Unwitnessed Fall   Generalized weakness   Mild rhabdomyolysis   The patient presents to the ED after an unwitnessed fall at home. He was reportedly in his usual state of health day prior. He went to [employer], and then visited some friends. When he got home he thinks he was making some thing to eat and then somehow ended up falling. He does not actually recall how he fell, but thinks he must have tripped or lost his balance. He states he did not hit his head or lost consciousness.   He was found on the ground in his apartment in the morning on day of admission by some friends after he did not show up for their regular breakfast date. He denied any dizziness. He denies any current pain. No injuries were identified  in the ED. He does have a mild CK elevation (755 on admission) consistent with some mild rhabdomyolysis.   *etiology of fall is not clear, could be dehydration. He is on antihypertensive PTA and BP has been, low normal range.    .IVF discontinued on 1/21.  on 1/22.  - encourage PO hydration, currently doing well with PO hydration  - f/up CK in 1 week at TCU  - PT/OT--> OT eval rec TCU  - discharging to TCU for rehab    Probable underlying cognitive impairment  Concern for possible encephalopathy on admission  *see H&P  *MRI of brain- motion degraded exam without acute finding. Chronic age related changes--refer to report for detail  *on 1/20,Spoke with patient's nephew, Krish, over the phone. He lives out of state, but Krish is the only living relative patient has. Erinn reports that patient has been a [employer] [facility] for about 16 years. Patient has been generally sharp until about few months ago where he noted some decline in his memory. [place] patient and left about 3 or 4 days ago. They have started looking for [facility] facility for patient where he could get more assistance than his current living situation where he lives alone in an apartment.  Krish reports to me that he has spoke to patient earlier today and felt he was about his baseline. Krish states patient has been forgetful and as the day goes on he gets tired.   *he has been alert, oriented and appropriately responding to questions as of 1/20-1/21  -     New onset atrial fibrillation, paroxysmal  EKG in the ED showed atrial fibrillation. I cannot see any history of atrial fibrillation in the chart; however it looks like he gets his primary care at [facility] which I do not see records for. The patient is unaware of a prior diagnosis of atrial fibrillation  *Rate is currently controlled without meds  * TTE LV 55-60%, no wall motion abnormality   *cardiology spoke with patient  also patent's nephew regarding  anticoagulation risk of bleeding with his falls/benefit of stroke prevention, refer to cardiology note for detail. Ultimately, decision was made for conservative approached and resuming PTA ASA only.   *cardiology evaluated this hospital stay.   - heparin discontinued on 1/21. Patient placed back on ASA 81mg daily  - plan for Zio-patch at discharge and cardiology follow up (ordered by cardiology)     Mild troponin elevation   Troponin is elevated to 26--29-39-39*on admission. Suspect some demand ischemia in the setting of a fib, rhabdo. Patient without chest pain. Per cardiology, holding off ischemic evaluation at this time and could be considered as outpatient.     Microscopic hematuria   Bladder outlet obstruction and Mild R sided hydronephrosis   Urinary retention  Noted on CT scan.   *needed straight cath on admission and additionally after admission  - Mccrary to be inserted on  1/21  - urology consulted, appreciate assistance--> recommend continuing mccrary for ~2 weeks, will arrange TOV and cysto in office. Signed off on 1/22  - not starting flomax at this time due to low normal blood pressure  - continue mccrary at discharge. Urology follow up for voiding trial. If unable to make it to urology appt in 2 weeks, consider voiding trial at TCU in 2 weeks, then arrange Urology follow up.      Mild age indeterminate T12 vertebral compression fracture  Noted on CT scan. Pt currently denies any pain        Hypomagnesemia: resolved  S/p replacement     Bilateral adrenal nodules   Incidental finding on CT scan   - Recommend outpatient monitoring      Hypothyroidism  - Resume PTA levothyroxine  - TSH normal range    Right foot erythema-improved  This is noted on 1/19-1/20 overnight. Evaluated by cross cover MD. US LE ordered and negative for DVT. Cellulitis was felt less likely based on exam  -1/20- he has mild erythema on right foot, no warmth, DP pulse present. No pain/tenderness. Able to walk on it.   -  low suspicion for  cellulitis. Elevate leg  -1/21- erythema seems to have much improved/resolved    HTN  PTA on Losartan and hydrochlorothiazide  - BP is low normal at this time.   - hold antihypertensives at discharge. Can be added back as needed on OP basis if BP increases      Type II DM  No recent A1c in the system. PTA on Metformin  - Resume PTA Metformin BID      Anemia  Hgb 13 on admission. No recent labs available  - hgb down trended 13->10.9>9.8, stable at 9.7g/dL on 1/22  - he does have microscopic hematuria, so far no gross hematuria. Suspect component of dilution from IVF    - follow up with urology.          Consultations This Hospital Stay   PHARMACY IP CONSULT  PHARMACY LIAISON FOR MEDICATION COVERAGE CONSULT  PHYSICAL THERAPY ADULT IP CONSULT  OCCUPATIONAL THERAPY ADULT IP CONSULT  CARE MANAGEMENT / SOCIAL WORK IP CONSULT  CARDIOLOGY IP CONSULT  WOUND OSTOMY CONTINENCE NURSE  IP CONSULT  UROLOGY IP CONSULT  PHYSICAL THERAPY ADULT IP CONSULT  OCCUPATIONAL THERAPY ADULT IP CONSULT    Code Status   No CPR- Do NOT Intubate    Time Spent on this Encounter   I, Jewel Bansal MD, personally saw the patient today and spent  32 minutes discharging this patient.       Jewel Bansal MD  [facility]  [address], [address]  [facility] [address]  Phone: [phone]  ______________________________________________________________________    Physical Exam   Vital Signs: Temp: 97.9  F (36.6  C) Temp src: Oral BP: 97/57 Pulse: 62   Resp: 18 SpO2: 96 % O2 Device: None (Room air)    Weight: 173 lbs 11.56 oz  General Appearance: Alert, awake and no apparent distress  Respiratory: clear to auscultation bilaterally, no wheezing  Cardiovascular: regular rate and rhythm  GI: soft and non-tender  Skin: warm and dyr         Primary Care Physician   Cam Colunga    Discharge Orders      Follow-Up with Cardiology YUKI      General info for SNF    Length of Stay Estimate: Short Term Care:  Estimated # of Days 31-90  Condition at Discharge: Improving  Level of care:skilled   Rehabilitation Potential: Good  Admission H&P remains valid and up-to-date: Yes  Recent Chemotherapy: N/A  Use Nursing Home Standing Orders: Yes     Mantoux instructions    Give two-step Mantoux (PPD) Per Facility Policy Yes     Reason for your hospital stay    You were admitted to the hospital after a fall.     Pineda catheter    To straight gravity drainage. Change catheter PRN for leaking or decreased urine output with signs of bladder distention. DO NOT change catheter without a specific Provider order IF diagnosis of benign prostatic hypertrophy (BPH), neurogenic bladder, or other urological conditions.  Voiding trial in 2 weeks.     Activity - Up with assistive device     Activity - Up with nursing assistance     Follow Up and recommended labs and tests    1.Follow up with Nursing home physician.  The following labs/tests are recommended: BMP, CK and CBC in 1 week.  2. Follow up with [facility] in 2 weeks for voiding trial and cystoscopy. The office you will call you to arrange. If you don't receive a call, Call [phone] to arrange.     Physical Therapy Adult Consult    Evaluate and treat as clinically indicated.    Reason:  physical deconditioning     Occupational Therapy Adult Consult    Evaluate and treat as clinically indicated.    Reason:  Physical deconditioning     Holter Monitor 48 hour Adult Pediatric     Fall precautions     Diet    Follow this diet upon discharge: Orders Placed This Encounter      Room Service      Snacks/Supplements Adult: Magic Cup; With Meals      Combination Diet Regular Diet Adult       Significant Results and Procedures   Most Recent 3 CBC's:  Recent Labs   Lab Test 01/22/24  0616 01/21/24  0419 01/20/24  0713   WBC 5.9 5.9 6.6   HGB 9.7* 9.8* 10.9*   MCV 90 90 90    147* 166     Most Recent 3 BMP's:  Recent Labs   Lab Test 01/22/24  2122 01/22/24  1642 01/22/24  1128  01/22/24  0845 01/22/24  0616 01/21/24  0742 01/21/24  0418 01/20/24  1703 01/20/24  0713   NA  --   --   --   --  140  --  136  --  138   POTASSIUM  --   --   --   --  4.4  --  4.3  --  4.1   CHLORIDE  --   --   --   --  109*  --  104  --  106   CO2  --   --   --   --  24  --  26  --  26   BUN  --   --   --   --  20.1  --  25.6*  --  25.9*   CR  --   --   --   --  0.91  --  0.96  --  0.98   ANIONGAP  --   --   --   --  7  --  6*  --  6*   AKILA  --   --   --   --  8.2*  --  8.1*  --  8.4*   * 93 93   < > 101*   < > 106*   < > 108*    < > = values in this interval not displayed.   ,   Results for orders placed or performed during the hospital encounter of 01/19/24   CTA Head Neck with Contrast    Narrative    CT HEAD W/O CONTRAST, CTA HEAD NECK W CONTRAST 1/19/2024 12:57 PM    CT head without contrast  CT angiogram of the neck   CT angiogram of the base of the brain with contrast  Reconstruction on the 3D workstation    Provided History:  Generalized weakness with fall before 5 PM  yesterday and new onset A-fib, no focal weakness, but suspect stroke    Comparison:  Head CT 12/23/2001.      Technique:  Head CT:Using multidetector thin collimation helical acquisition  technique, axial, coronal and sagittal CT images from the skull base  to the vertex were obtained without intravenous contrast.   (topogram) image(s) also obtained and reviewed.  HEAD and NECK CTA: During rapid bolus intravenous injection of  nonionic contrast material, axial images were obtained using thin  collimation multidetector helical technique from the base of the upper  aortic arch through the [place]. This CT angiogram data was  reconstructed at thin intervals with mild overlap. Images were sent to  the 3D workstation, and 3D reconstructions were obtained. The axial  source images, multiplanar reformations, 3D reconstructions in both  maximum intensity projection display and volume rendered models were  reviewed, with  reconstructions performed by the technologist.    Contrast: 67mL [id number]-[id number]    Findings:    Head CT demonstrates no intracranial hemorrhage, mass effect, or  midline shift. Gray/white matter differentiation in both cerebral  hemispheres is preserved. Ventricles are proportionate to the cerebral  sulci. The basal cisterns are clear. Advanced diffuse cerebral volume  loss and patchy periventricular hypoattenuation, consistent with  chronic small vessel ischemic disease.    The bony calvaria and the bones of the skull base are normal.  Scattered paranasal sinus mucosal thickening. Mastoid air cells are  clear.    Head CTA demonstrates no aneurysm or stenosis of the major  intracranial arteries. Fetal origin of right PCA.     Neck CTA demonstrates no stenosis of the major cervical arteries. The  origins of the great vessels from the aortic arch are patent.     No mass within the visualized portions of the cervical soft tissues or  lung apices.     Cervical spondylosis.      Impression    Impression:    1. Chronic small vessel ischemic disease and advanced diffuse cerebral  volume loss.  2. Head CTA demonstrates no aneurysm or stenosis of the major  intracranial arteries.   3. Neck CTA demonstrates no stenosis of the major cervical arteries.    MENDOZA DE LA ROSA MD         SYSTEM ID:  [id number]   Head CT w/o contrast    Narrative    CT HEAD W/O CONTRAST, CTA HEAD NECK W CONTRAST 1/19/2024 12:57 PM    CT head without contrast  CT angiogram of the neck   CT angiogram of the base of the brain with contrast  Reconstruction on the 3D workstation    Provided History:  Generalized weakness with fall before 5 PM  yesterday and new onset A-fib, no focal weakness, but suspect stroke    Comparison:  Head CT 12/23/2001.      Technique:  Head CT:Using multidetector thin collimation helical acquisition  technique, axial, coronal and sagittal CT images from the skull base  to the vertex were obtained without intravenous contrast.    (topogram) image(s) also obtained and reviewed.  HEAD and NECK CTA: During rapid bolus intravenous injection of  nonionic contrast material, axial images were obtained using thin  collimation multidetector helical technique from the base of the upper  aortic arch through the [place]. This CT angiogram data was  reconstructed at thin intervals with mild overlap. Images were sent to  the 3D workstation, and 3D reconstructions were obtained. The axial  source images, multiplanar reformations, 3D reconstructions in both  maximum intensity projection display and volume rendered models were  reviewed, with reconstructions performed by the technologist.    Contrast: 67mL ISOVUE-370    Findings:    Head CT demonstrates no intracranial hemorrhage, mass effect, or  midline shift. Gray/white matter differentiation in both cerebral  hemispheres is preserved. Ventricles are proportionate to the cerebral  sulci. The basal cisterns are clear. Advanced diffuse cerebral volume  loss and patchy periventricular hypoattenuation, consistent with  chronic small vessel ischemic disease.    The bony calvaria and the bones of the skull base are normal.  Scattered paranasal sinus mucosal thickening. Mastoid air cells are  clear.    Head CTA demonstrates no aneurysm or stenosis of the major  intracranial arteries. Fetal origin of right PCA.     Neck CTA demonstrates no stenosis of the major cervical arteries. The  origins of the great vessels from the aortic arch are patent.     No mass within the visualized portions of the cervical soft tissues or  lung apices.     Cervical spondylosis.      Impression    Impression:    1. Chronic small vessel ischemic disease and advanced diffuse cerebral  volume loss.  2. Head CTA demonstrates no aneurysm or stenosis of the major  intracranial arteries.   3. Neck CTA demonstrates no stenosis of the major cervical arteries.    MENDOZA DE LA ROSA MD         SYSTEM ID:  [id number]   Ribs XR,  unilat 3 views + PA chest, right    Narrative    Examination:  XR RIBS & CHEST RT 3VW    Date:  1/19/2024 12:56 PM     Clinical Information: Altered mental status, fall, pain/bruise to the  right upper lateral ribs    Comparison: none.      Impression    Impression:    1.  No displaced rib fracture. No nondisplaced rib fracture or rib  lesion visualized.    2.  Clear lungs without infiltrate, pleural effusion, or pneumothorax.  Normal heart size and silhouette.    3.  Moderate endplate hypertrophic changes throughout the thoracic  spine.    WADE MAR MD         SYSTEM ID:  [id number]   Abd/pelvis CT no contrast - Stone Protocol    Addendum: 1/19/2024    Indeterminate bilateral adrenal nodules measuring up to 1.0 cm.  Suggest follow-up MRI or CT adrenal protocol.  Scattered calcifications along the pancreas may suggest sequela of  prior pancreatitis.    JHON FOSTER MD         SYSTEM ID:  [id number]      Narrative    CT ABDOMEN PELVIS W/O CONTRAST 1/19/2024 1:29 PM    CLINICAL HISTORY: Syncope and fall, hematuria  TECHNIQUE: CT scan of the abdomen and pelvis was performed without IV  contrast. Multiplanar reformats were obtained. Dose reduction  techniques were used.  CONTRAST: None.    COMPARISON: 5/22/2009    FINDINGS:   LOWER CHEST: Coronary artery calcifications are present. Subsegmental  atelectasis    HEPATOBILIARY: No significant mass or bile duct dilatation. No  calcified gallstones.     PANCREAS: No significant mass, duct dilatation, or inflammatory  change.    SPLEEN: Normal size.    ADRENAL GLANDS: Indeterminate bilateral adrenal nodules are present  measuring up to 1.0 cm.    KIDNEYS/BLADDER: Contrast is seen within the bilateral renal  collecting systems from earlier contrast enhanced exam. No  nephrolithiasis is present. Mild right-sided hydronephrosis is seen.  Multiple diverticula are seen along the posterior bladder wall  measuring up to 1.1 cm. Urinary bladder appears moderately  distended.    BOWEL: Diverticulosis in the colon. No acute inflammatory change. No  obstruction.     LYMPH NODES: No lymphadenopathy.    VASCULATURE: Scattered vascular calcification is seen in the abdominal  aorta and iliac branches.    PELVIC ORGANS: Prostate gland is enlarged measuring 5.4 cm in  transverse diameter.    OTHER: None.    MUSCULOSKELETAL: Multilevel degenerative changes are seen in the  spine. Mild age-indeterminate compression deformity is seen along the  T12 vertebral level.      Impression    IMPRESSION:   1.  Mild right-sided hydronephrosis is present. Urinary bladder  appears distended with multiple diverticula. Constellation of findings  are suggestive of bladder outlet obstruction given enlarged prostate  gland.  2.  Mild age-indeterminate compression deformity at the T12 vertebral  level. Further characterization with spinal imaging may be considered.  3.  Indeterminate bilateral adrenal nodules measuring up to 1.0 cm.    JHON FOSTER MD         SYSTEM ID:  [id number]   MR Brain w/o Contrast    Narrative    EXAM: MR BRAIN W/O CONTRAST  LOCATION: [facility]  DATE: 1/19/2024    INDICATION: Generalized weakness.  COMPARISON: CT/CTA performed earlier today. No prior MRI is available for comparison.  TECHNIQUE: Routine multiplanar multisequence head MRI without intravenous contrast.    FINDINGS:  Motion degraded exam. Within this limitation:    INTRACRANIAL CONTENTS: No acute/subacute infarct, large extra-axial fluid collection, or acute hemorrhage. No mass effect or midline shift. Scattered punctate and patchy foci of T2 prolongation within the bilateral cerebral white matter, nonspecific   although most likely the sequela of moderate chronic microvascular ischemia. Moderate generalized cerebral parenchymal volume loss. No hydrocephalus. Normal position of the cerebellar tonsils.     OTHER: Mild mucosal thickening scattered about the paranasal sinuses without an  air-fluid level.      Impression    IMPRESSION:  1.  Motion degraded exam without an acute intracranial abnormality.  2.  Chronic age-related changes, as described.   US Lower Extremity Venous Duplex Bilateral    Narrative    EXAM: US LOWER EXTREMITY VENOUS DUPLEX BILATERAL  LOCATION: [facility]  DATE: 2024    INDICATION: Right foot warmth, recent fall.  COMPARISON: None.  TECHNIQUE: Venous Duplex ultrasound of bilateral lower extremities with and without compression, augmentation and duplex. Color flow and spectral Doppler with waveform analysis performed.    FINDINGS: Exam includes the common femoral, femoral, popliteal veins as well as segmentally visualized deep calf veins and greater saphenous vein.     RIGHT: No deep vein thrombosis. No superficial thrombophlebitis. No popliteal cyst.    LEFT: No deep vein thrombosis. No superficial thrombophlebitis. No popliteal cyst.      Impression    IMPRESSION:  1.  No deep venous thrombosis in the bilateral lower extremities.   Echocardiogram Complete     Value    LVEF  55-60%    Narrative    [id number]  [id number]  [id number]  [id number]^ALEXA^SHELBY^CRISTOBAL     [facility]  Echocardiography Laboratory  [address] [place] [address]     Name: KAREN GENAO  MRN: [id number]  : 1940  Study Date: 2024 10:38 AM  Age: 83 yrs  Gender: Male  Patient Location: [facility]  Reason For Study: Atrial Fibrillation  Ordering Physician: SHELBY LIU  Referring Physician: Cam Colunga  Performed By: Tigist Rose RDCS     BSA: 2.0 m2  Height: 71 in  Weight: 180 lb  HR: 85  BP: 110/75 mmHg  ______________________________________________________________________________  Procedure  Complete Portable Echo Adult.  ______________________________________________________________________________  Interpretation Summary     Sinus rhythm was noted.  The patient exhibited occasional PACs.  Left ventricular systolic function is  normal.  The visual ejection fraction is 55-60%.  The left ventricle is normal in size.  The right ventricle is normal in structure, function and size.  There is trace mitral regurgitation.     The pericardial effusion seen on the last study 5/21/2009 has resolved. The  study is otherwise unremarkable except for premature atrial contractions.  ______________________________________________________________________________  Left Ventricle  The left ventricle is normal in size. There is normal left ventricular wall  thickness. Left ventricular systolic function is normal. The visual ejection  fraction is 55-60%. Left ventricular diastolic function is indeterminate. No  regional wall motion abnormalities noted. There is no thrombus seen in the  left ventricle.     Right Ventricle  The right ventricle is normal in structure, function and size. There is no  mass or thrombus in the right ventricle.     Atria  Normal left atrial size. Right atrial size is normal. There is no atrial shunt  seen. The left atrial appendage is not well visualized.     Mitral Valve  The mitral valve leaflets appear normal. There is no evidence of stenosis,  fluttering, or prolapse. There is trace mitral regurgitation. There is no  mitral valve stenosis.     Tricuspid Valve  Normal tricuspid valve. The right ventricular systolic pressure is  approximated at 28.5 mmHg plus the right atrial pressure. Right ventricle  systolic pressure estimate normal. There is mild (1+) tricuspid regurgitation.  There is no tricuspid stenosis.     Aortic Valve  There is mild trileaflet aortic sclerosis. No aortic regurgitation is present.  No aortic stenosis is present.     Pulmonic Valve  Normal pulmonic valve. There is no pulmonic valvular regurgitation. There is  no pulmonic valvular stenosis.     Vessels  The aortic root is normal size. Normal size ascending aorta. The inferior vena  cava is normal. The pulmonary artery is normal size.     Pericardium  The  pericardium appears normal. There is no pleural effusion.     Rhythm  Sinus rhythm was noted. The patient exhibited occasional PACs.  ______________________________________________________________________________  MMode/2D Measurements & Calculations  IVSd: 0.82 cm     LVIDd: 3.7 cm  LVIDs: 2.3 cm  LVPWd: 0.87 cm  FS: 38.4 %  LV mass(C)d: 86.4 grams  LV mass(C)dI: 42.8 grams/m2  Ao root diam: 3.2 cm  Ao root diam index Ht(cm/m): 1.8  Ao root diam index BSA (cm/m2): 1.6  LA Volume (BP): 33.2 ml  LA Volume Index (BP): 16.4 ml/m2  RWT: 0.48  TAPSE: 1.8 cm     Doppler Measurements & Calculations  MV E max mingo: 61.6 cm/sec  MV A max mingo: 66.9 cm/sec  MV E/A: 0.92  MV dec time: 0.26 sec  PA acc time: 0.07 sec  TR max mingo: 267.0 cm/sec  TR max P.5 mmHg  E/E' av.5  Lateral E/e': 3.1  Medial E/e': 5.8  RV S Mingo: 11.2 cm/sec     ______________________________________________________________________________  Report approved by: Dr. Jabari Espinoza 2024 12:57 PM             Discharge Medications   Current Discharge Medication List        CONTINUE these medications which have NOT CHANGED    Details   aspirin 81 MG EC tablet Take 81 mg by mouth daily      fluticasone (FLONASE) 50 MCG/ACT nasal spray Spray 1 spray into both nostrils daily      levothyroxine (SYNTHROID/LEVOTHROID) 75 MCG tablet Take 75 mcg by mouth daily before breakfast      metFORMIN (GLUCOPHAGE) 850 MG tablet Take 850 mg by mouth 2 times daily (with meals)      simvastatin (ZOCOR) 40 MG tablet Take 40 mg by mouth at bedtime           STOP taking these medications       losartan-hydrochlorothiazide (HYZAAR) 100-12.5 MG tablet Comments:   Reason for Stopping:             Allergies   No Known Allergies

## 2024-01-23 NOTE — PLAN OF CARE
Occupational Therapy Discharge Summary    Reason for therapy discharge:    Discharged to transitional care facility.    Progress towards therapy goal(s). See goals on Care Plan in [facility] electronic health record for goal details.  Goals not met.  Barriers to achieving goals:   discharge from facility.    Therapy recommendation(s):    Continued therapy is recommended.  Rationale/Recommendations:  TCU to progress I/ADL independence.

## 2024-01-23 NOTE — PLAN OF CARE
Goal Outcome Evaluation: Progressing    Reason for Admission: Unwitnessed fall    Evaluation of Goal:   Patient is A&Ox 4; occasionally forgetful overnight. Vitals are stable on RA. Tele NSR. Patient reported pain to his bilateral lower extremities for which tylenol was given with good relief. Patient turns and weights shift independently in the bed. Pineda is patent with good urine output. Patient is up with assist of one with gait belt and walker. Sleep Quality moderate. Patient scoring green on the Aggression Stoplight Tool. Pt calm and cooperative with cares, able to make needs known, call light within reach, bed alarm on for safety. Discharge disposition is pending.       Alka Lei RN on 1/23/2024 at 6:53 AM

## 2024-01-23 NOTE — PROGRESS NOTES
Care Management Discharge Note    Discharge Date: 01/23/2024       Discharge Disposition: Transitional Care    Discharge Services: None    Discharge DME: None    Discharge Transportation:  Family or Friends    Private pay costs discussed: Not applicable    Does the patient's insurance plan have a 3 day qualifying hospital stay waiver?  Yes     Which insurance plan 3 day waiver is available? Alternative insurance waiver    Will the waiver be used for post-acute placement? Yes    PAS Confirmation Code: [id number]  Patient/family educated on Medicare website which has current facility and service quality ratings: yes    Education Provided on the Discharge Plan: Yes  Persons Notified of Discharge Plans: Patient, family, Friends, Nursing, TCU, MD  Patient/Family in Agreement with the Plan: yes    Handoff Referral Completed: Yes    Additional Information:  PAS completed and placed on chart. Confirmed with Dr. Bansal that patient is ready for discharge. Writer confirmed with Estella at [facility] the plan for discharge around 1500 and she is in agreement. Patient's nephews and friends were updated yesterday of the plan and are all in agreement.     PAS-RR    D: Per DHS regulation, SW completed and submitted PAS-RR to [place] [employer] Direct Connect via the Senior LinkAge Line.  PAS-RR confirmation # is : [id number]    I: SW spoke with patient and they are aware a PAS-RR has been submitted.  SW reviewed with patient that they may be contacted for a follow up appointment within 10 days of hospital discharge if their SNF stay is < 30 days.  Contact information for [facility] LinkAge Line was also provided.    A: patient verbalized understanding.    P: Further questions may be directed to [facility] LinkAge Line at #[phone], option #4 for PAS-RR staff.     Addendum 1030: Discharge orders received and faxed via DOD to [facility] on Kellie.     Nery Louise, VALERY, LGSW   Social Work   [facility]

## 2024-01-23 NOTE — PLAN OF CARE
A&O x 4. VSS. RA. Tele: Anastacia CVR. A1GBW. Miriam patent, good UOP. Neuro's unchanged. Tylenol x 1 for BLE leg aching. Plan to transfer to [place] at 1500 tomorrow. Will continue w/plan of care.

## 2024-01-23 NOTE — PLAN OF CARE
A&O x 4. Forgetful at times. VSS. RA. Tele: Afib CVR. Tylenol x 1 for BLE aching. Assist 1 x w/GB/Walker. Miriam patent, good UOP. Blood glucose checks, S/S. Pt transferring to [place] at 1500 today.

## 2024-01-24 NOTE — PROGRESS NOTES
[facility] GERIATRICS    PRIMARY CARE PROVIDER AND CLINIC:  Cam Colunga MD, [address] / [place] [address]  Chief Complaint   Patient presents with    Hospital F/U      [facility] Medical Record Number:  [id number]  Place of Service where encounter took place:  [facility] ([facility]) [[id number]]      HPI:    Patient is an 83-year-old male with past medical history of hypertension, diabetes mellitus type 2, asthma, hypothyroidism who was admitted at [facility] from 1/19 - 1/23, 2024 after presenting following a unwitnessed fall and possible syncopal episode.  Per notes, patient is very active in the community and when he did not show for a usual breakfast date friends went to check on him and found him on the ground in his apartment.  Patient did not recall the events surrounding the fall, did note it happened the day prior.  On arrival in the emergency department, acute CVA was considered although patient did not have any focal neurologic deficits.  EKG confirmed A-fib with a controlled rate, new diagnosis for patient.  CT head as well as CTA of the head and neck were negative for acute stroke.  He was discussed with the vascular neurology service who recommended follow-up MRI.  Further workup included CT abdomen and pelvis with evidence of mild hydronephrosis and likely urinary outlet obstruction, PVR indicated returned urine but patient was able to spontaneously void.  Additional lab work revealed an elevated CK suggestive of rhabdomyolysis.  UA was negative for infection, did indicate hematuria.  Troponin was also drawn and mildly elevated.  He is admitted, treated with IV fluids with downtrending CK values.  MRI of the brain was negative for acute findings, noted motion artifact.  In regards to the new onset A-fib, patient was seen by cardiology and risk/benefits of anticoagulation discussed.  Ultimately decision was made for conservative approach with aspirin for CVA prophylaxis.  Zio patch was  "placed at hospital discharge.  Elevated troponin was suspected to be due to due to demand ischemia.  Following admission, patient was noted to have ongoing urinary retention with persistently elevated PVRs prompting insertion of Pineda catheter.  Patient was seen by urology and recommended to continue with catheter for 2 weeks then follow-up in clinic with outpatient trial of void.  Following therapy evaluations, patient was referred to TCU for ongoing rehab and medical management.    Patient is presently evaluated as initial visit.  He is sitting up in recliner at bedside with friends visiting.  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  He has retired at this point but does continue to attend [employer] meetings.  It is noted in the hospital notes that prior to admission, patient had been starting to look into assisted living placement with his nephew.  He confirms this,  at TCU has been in and discussed with patient and they have been starting to look into assisted living's in [place] where patient would prefer to stay.  He otherwise confirms DNR/DNI CODE STATUS.  Staff are without immediate concerns.    CODE STATUS/ADVANCE DIRECTIVES DISCUSSION:  Prior  DNR / DNI  ALLERGIES: No Known Allergies   PAST MEDICAL HISTORY:   Past Medical History:   Diagnosis Date    Diabetes (H)     Hypertension     Uncomplicated asthma       PAST SURGICAL HISTORY:   has a past surgical history that includes Soft tissue surgery.  FAMILY HISTORY: family history is not on file.  SOCIAL HISTORY:   reports that he has never smoked. He has never used smokeless tobacco. He reports that he does not drink alcohol and does not use drugs.  Patient's living condition: lives alone    Post Discharge Medication Reconciliation Status:   MED REC REQUIRED  Post Medication Reconciliation " "Status: discharge medications reconciled, continue medications without change       Current Outpatient Medications   Medication Sig    aspirin 81 MG EC tablet Take 81 mg by mouth daily    fluticasone (FLONASE) 50 MCG/ACT nasal spray Spray 1 spray into both nostrils daily    levothyroxine (SYNTHROID/LEVOTHROID) 75 MCG tablet Take 75 mcg by mouth daily before breakfast    metFORMIN (GLUCOPHAGE) 850 MG tablet Take 850 mg by mouth 2 times daily (with meals)    simvastatin (ZOCOR) 40 MG tablet Take 40 mg by mouth at bedtime     No current facility-administered medications for this visit.       ROS:  4 point ROS including Respiratory, CV, GI and , other than that noted in the HPI,  is negative    Vitals:  /64   Pulse 69   Temp 97.7  F (36.5  C)   Resp 18   Ht 1.803 m (5' 11\")   SpO2 98%   BMI 24.23 kg/m    Exam:  GEN: well-developed, well-nourished, appears comfortable  HEENT: NCAT, EOM intact bilaterally, sclera clear, conjunctiva normal, nose & mouth patent, mucous membranes moist  CHEST: lungs CTA bilaterally, no increased work of breathing, no wheeze, crackles, rhonchi  HEART: RRR, S1 & S2, no murmur  ABD: soft, nontender, nondistended, no guarding or rigidity, +BS in all 4 quadrants  : mccrary catheter in place draining clear yellow urine, no gross hematuria, no sediment  MSK: AROM bilateral UE/LE, pedal & radial pulses 2+ bilaterally  NEURO: awake, alert, oriented to name, place, and time. CN II-XII grossly intact. Sensation grossly intact to light touch.   SKIN: warm & dry without rash, no pedal edema    Lab/Diagnostic data:  Recent labs in [facility] reviewed by me today.     ASSESSMENT/PLAN:    Unwitnessed fall, possible syncope  Generalized weakness  Impaired mobility and ADLs  Physical deconditioning  Mild rhabdomyolysis  As detailed above, patient presented following a fall in his home, events remain unclear however patient was found to have new diagnosis of A-fib.  Found to have mildly elevated CK due " to underlying floor overnight treated with IV fluids, denies pain.  -PT/OT evaluations  -SW for discharge planning  -Repeat CK on 1/25    A-fib, paroxysmal, new diagnosis  A-fib confirmed on EKG in ED, rate controlled without medications.  TTE with LVEF 55 to 60%, no WMA's.  Seen in consultation with cardiology, deferred anticoagulation following lengthy conversation.  -Continues on ASA 81 mg/day  -Zio patch  -Follow-up with cardiology as indicated  -Monitor heart rates    Microscopic hematuria  Bladder outlet obstruction  Urinary retention  Noted on CT scan in ED, required straight catheterization with persistently elevated PVRs prompting Pineda catheter insertion and urology consultation.  Current plan is to continue Pineda catheter for 2 weeks and follow-up as an outpatient.  Flomax was not started due to low-normal blood pressures.  -Continue Pineda catheter with routine cares  -Follow-up with urology as scheduled    T12 vertebral compression fracture  Age indeterminant, noted on CT imaging.  Nonpainful.    Hypothyroidism  Chronic and stable.  -Continues on levothyroxine    Hypertension  Chronically on Hyzaar, held at discharge due to low-normal blood pressures.  -Monitor trend    Diabetes mellitus type 2  A1c 5.8% on 1/20/2024.  -Continues on metformin twice daily    Anemia  Hgb 13 on admission, down trended to value 9.7 on 1/22.  Suspected component of dilution from IV fluids given rhabdomyolysis treatment.  Note history of microscopic hematuria, no evidence of gross hematuria.  -Monitor for bleeding S/S  -CBC on 1/25    Total time spent with patient visit at the skilled nursing facility was 45 min including patient visit and review of past records.     Electronically signed by:  Waldo Rosen PA-C

## 2024-01-24 NOTE — PLAN OF CARE
Physical Therapy Discharge Summary    Reason for therapy discharge:    Discharged to transitional care facility.    Progress towards therapy goal(s). See goals on Care Plan in [facility] electronic health record for goal details.  Goals partially met.  Barriers to achieving goals:   limited tolerance for therapy.    Therapy recommendation(s):    Continued therapy is recommended.  Rationale/Recommendations:  To further increase independence with mobility.

## 2024-01-25 NOTE — TELEPHONE ENCOUNTER
Patient was admitted to [facility] on 1/19/24 after a fall, found to be in atrial fibrillation and mild rhabdomyolysis. Elevated troponin's-suspect some demand ischemia in the setting of A. FIb, rhabdo. Frequent falls about 3 times a month.     PMH: hypertension, diabetes, asthma.    1/19/24: CTA head and neck.    1/20/24: Echo showed EF of 55-60%, no wall motion abnormality.    PTA Hyzaar was discontinued at time of discharge. Cardiology recommends placement of 48 hr Holter Monitor.    Pt is scheduled for an OV on 2/28/24 at 1520 with YUKI Yakelin Flores in [place].    Pt was discharged to [facility].    Message sent to scheduling to call TCU to schedule placement of heart monitor and verify OV as above. LADY Gaffney RN.

## 2024-01-26 NOTE — PROGRESS NOTES
[facility] GERIATRICS    Chief Complaint   Patient presents with    RECHECK     HPI:  Parviz Yip is a 83 year old  (1940), who is being seen today for an episodic care visit at: [facility]) [[id number]].     Summary: Patient is an 83-year-old male with past medical history of hypertension, diabetes mellitus type 2, asthma, hypothyroidism who was admitted at [facility] from 1/19 - 1/23, 2024 after presenting following a unwitnessed fall and possible syncopal episode. Per notes, patient is very active in the community and when he did not show for a usual breakfast date friends went to check on him and found him on the ground in his apartment. Patient did not recall the events surrounding the fall, did note it happened the day prior. On arrival in the emergency department, acute CVA was considered although patient did not have any focal neurologic deficits. EKG confirmed A-fib with a controlled rate, new diagnosis for patient. CT head as well as CTA of the head and neck were negative for acute stroke. He was discussed with the vascular neurology service who recommended follow-up MRI. Further workup included CT abdomen and pelvis with evidence of mild hydronephrosis and likely urinary outlet obstruction, PVR indicated returned urine but patient was able to spontaneously void. Additional lab work revealed an elevated CK suggestive of rhabdomyolysis. UA was negative for infection, did indicate hematuria. Troponin was also drawn and mildly elevated. He is admitted, treated with IV fluids with downtrending CK values. MRI of the brain was negative for acute findings, noted motion artifact. In regards to the new onset A-fib, patient was seen by cardiology and risk/benefits of anticoagulation discussed. Ultimately decision was made for conservative approach with aspirin for CVA prophylaxis. Zio patch was placed at hospital discharge. Elevated troponin was suspected to be due to due to demand ischemia.  "Following admission, patient was noted to have ongoing urinary retention with persistently elevated PVRs prompting insertion of Mccrary catheter. Patient was seen by urology and recommended to continue with catheter for 2 weeks then follow-up in clinic with outpatient trial of void. Following therapy evaluations, patient was referred to TCU for ongoing rehab and medical management.     Today, pt is seen in follow-up. He is sitting up in recliner at bedside, mariana visiting with him today. Reports he is doing well. Continues with mild paresthesias and muscle aches in bilateral thighs, limiting his participation in therapy. Denies pain, states it is more of an ache. No numbness/tingling. Denies syncope, lightheadedness/dizziness, cp, sob.     Allergies, and PMH/PSH reviewed in EPIC today.  REVIEW OF SYSTEMS:  4 point ROS including Respiratory, CV, GI and , other than that noted in the HPI,  is negative    Objective:   /69   Pulse 68   Temp 97.2  F (36.2  C)   Resp 16   Ht 1.803 m (5' 11\")   Wt 78.2 kg (172 lb 6.4 oz)   SpO2 99%   BMI 24.04 kg/m    GEN: well-developed, well-nourished, appears comfortable  HEENT: NCAT, EOM intact bilaterally, sclera clear, conjunctiva normal, nose & mouth patent, mucous membranes moist  CHEST: lungs CTA bilaterally, no increased work of breathing, no wheeze, crackles, rhonchi  HEART: RRR, S1 & S2, no murmur  ABD: soft, nontender, nondistended, no guarding or rigidity, +BS in all 4 quadrants  : mccrary catheter in place draining clear yellow urine, no gross hematuria, no sediment  MSK: AROM bilateral UE/LE, pedal & radial pulses 2+ bilaterally  NEURO: awake, alert, oriented to name, place, and time. CN II-XII grossly intact. Sensation grossly intact to light touch.   SKIN: warm & dry without rash, no pedal edema    Most Recent 3 CBC's:  Recent Labs   Lab Test 01/25/24  0637 01/22/24  0616 01/21/24  0419   WBC 5.8 5.9 5.9   HGB 11.1* 9.7* 9.8*   MCV 93 90 90    162 " 147*     Most Recent 3 BMP's:  Recent Labs   Lab Test 01/25/24  0637 01/23/24  1205 01/22/24  2122 01/22/24  0845 01/22/24  0616 01/21/24  0742 01/21/24  0418     --   --   --  140  --  136   POTASSIUM 4.6  --   --   --  4.4  --  4.3   CHLORIDE 104  --   --   --  109*  --  104   CO2 28  --   --   --  24  --  26   BUN 22.6  --   --   --  20.1  --  25.6*   CR 0.97  --   --   --  0.91  --  0.96   ANIONGAP 7  --   --   --  7  --  6*   AKILA 9.5  --   --   --  8.2*  --  8.1*   GLC 85 131* 109*   < > 101*   < > 106*    < > = values in this interval not displayed.     Most Recent CPK:  Recent Labs   Lab Test 01/25/24  0637 01/22/24  0616 01/21/24 0418    379* 629*       Assessment/Plan:    Unwitnessed fall, possible syncope  Generalized weakness  Impaired mobility and ADLs  Physical deconditioning  Mild rhabdomyolysis  As detailed above, patient presented following a fall in his home, events remain unclear however patient was found to have new diagnosis of A-fib.  Found to have mildly elevated CK due to underlying floor overnight treated with IV fluids, denies pain. Repeat CK nml. Continues with muscle cramps. Ca, K nml on BMP 1/25.  -PT/OT continuing  -SW for discharge planning  -Add tizanidine 4mg TID PRN m spasm  -Mg value 1/29     A-fib, paroxysmal, new diagnosis  A-fib confirmed on EKG in ED, rate controlled without medications.  TTE with LVEF 55 to 60%, no WMA's.  Seen in consultation with cardiology, deferred anticoagulation following lengthy conversation. HRs controlled.  -Continues on ASA 81 mg/day  -Zio patch  -Follow-up with cardiology as indicated  -Monitor heart rates     Microscopic hematuria  Bladder outlet obstruction  Urinary retention  Noted on CT scan in ED, required straight catheterization with persistently elevated PVRs prompting Pineda catheter insertion and urology consultation.  Current plan is to continue Pineda catheter for 2 weeks and follow-up as an outpatient.  Flomax was not started  due to low-normal blood pressures.  -Continue Pineda catheter with routine cares  -Follow-up with urology as scheduled     T12 vertebral compression fracture  Age indeterminant, noted on CT imaging.  Nonpainful.     Hypothyroidism  Chronic and stable.  -Continues on levothyroxine     Hypertension  Chronically on Hyzaar, held at discharge due to low-normal blood pressures. BPs remain controlled, ranging 101-120.  -Monitor trend     Diabetes mellitus type 2  A1c 5.8% on 1/20/2024. BG values 100-163, controlled.  -Continues on metformin twice daily     Anemia  Hgb 13 on admission, down trended to value 9.7 on 1/22.  Suspected component of dilution from IV fluids given rhabdomyolysis treatment.  Note history of microscopic hematuria, no evidence of gross hematuria. Repeat value 11.1.  -Monitor for bleeding S/S    MED REC REQUIRED  Post Medication Reconciliation Status: patient was not discharged from an inpatient facility or TCU      Electronically signed by: Waldo Rosen PA-C

## 2024-01-30 NOTE — PROGRESS NOTES
[facility]  INITIAL VISIT NOTE  January 31, 2024    PRIMARY CARE PROVIDER AND CLINIC:  Cam Colunga [address] / [place] [id number]    CHIEF COMPLAINT:  Hospital follow-up/Initial visit    HPI:    Parviz Yip is a 83 year old  (1940) male who [facility] [place] [facility] for an initial visit.     Medical history is notable for hypertension, pericardial effusion, dyslipidemia, DM type II, hypothyroidism, asthma, allergic rhinitis, BPH, and bladder stones.    Summary of hospital course:  Patient was hospitalized at [facility] from January 19 through January 23, 2024 after presenting with generalized weakness and unwitnessed fall and found to have mild rhabdomyolysis, new atrial fibrillation, and urinary retention.   Initial laboratory evaluation was unremarkable for creatinine 0.93, sodium 140, lactic acid 1.7, high-sensitivity troponin I 29-39, TSH 2.74, total , white count 9.5, and hemoglobin 13.  UA was significant for large blood in the urine.  Screening was negative for COVID-19, influenza, and RSV.  EKG showed atrial fibrillation with a heart rate of 63 bpm.  CT head and CT angio head and neck were unremarkable.  X-ray of the chest/ribs showed no rib fracture or pulmonary infiltrate.  CT abdomen/pelvis was significant for mild right-sided hydronephrosis, distended urinary bladder with multiple diverticula, and age-indeterminate compression deformity at the T12 vertebral level.  Brain MRI revealed chronic age-related changes but no acute intracranial abnormality.  Doppler study of lower extremity was negative for DVT. Echocardiogram was remarkable for normal LV systolic function with EF 55 to 60%, indeterminate LV diastolic function, and trace mitral regurgitation.  He was hydrated with IV fluids.  Cardiology was consulted and after discussion with family, they deferred chronic anticoagulation given the risk of  bleeding and fall.  Urology was consulted for urinary retention and a Pineda catheter was placed.  Tamsulosin was not initiated due to low normal blood pressures.  PTA losartan-HCTZ was discontinued. TCU was recommended per therapies.    Patient is admitted to this facility for medical management, nursing care, and rehab.     Of note, history was obtained from patient, facility RN, and extensive review of the chart.    Today's visit:  Patient was seen in his room, sitting in the recliner.  He appears frail but comfortable.  He has a Pineda catheter.  Blood pressure is running soft.  He denies fever, chills, chest pain, palpitation, dyspnea, nausea, vomiting, or abdominal pain.  He reports pain in his penis as well as in his legs.  He had a BM earlier today.      CODE STATUS:   DNR / DNI    PAST MEDICAL HISTORY:   Atrial fibrillation (new diagnosis in January 2024)  Hypertension  Pericardial effusion  Dyslipidemia  DM type II  Hypothyroidism  Uncomplicated asthma  Allergic rhinitis  BPH  Bladder diverticula  Bladder stones  Mild right hydronephrosis  T12 compression fracture  Bilateral adrenal nodules (up to 1 cm, per CT on January 19, 2024)    Past Medical History:   Diagnosis Date     Diabetes (H)      Hypertension      Uncomplicated asthma        PAST SURGICAL HISTORY:   Past Surgical History:   Procedure Laterality Date     SOFT TISSUE SURGERY         FAMILY HISTORY:   Family history is significant for hypertension in his mother and Parkinson's disease in his father.    SOCIAL HISTORY:  Social History     Tobacco Use     Smoking status: Never     Smokeless tobacco: Never   Substance Use Topics     Alcohol use: Never       MEDICATIONS:  Current Outpatient Medications   Medication Sig Dispense Refill     aspirin 81 MG EC tablet Take 81 mg by mouth daily       fluticasone (FLONASE) 50 MCG/ACT nasal spray Spray 1 spray into both nostrils daily       levothyroxine (SYNTHROID/LEVOTHROID) 75 MCG tablet Take 75 mcg by  "mouth daily before breakfast       metFORMIN (GLUCOPHAGE) 850 MG tablet Take 850 mg by mouth 2 times daily (with meals)       simvastatin (ZOCOR) 40 MG tablet Take 40 mg by mouth at bedtime         MED REC REQUIRED  Post Medication Reconciliation Status: medication reconcilation previously completed during another office visit      ALLERGIES:  No Known Allergies    ROS:  10 point ROS were negative other than the symptoms noted above in the HPI.    PHYSICAL EXAM:  Vital signs were reviewed in the chart.  Vital Signs: BP 94/56   Pulse 62   Temp 97.7  F (36.5  C)   Resp 18   Ht 1.803 m (5' 11\")   Wt 78.3 kg (172 lb 9.6 oz)   SpO2 96%   BMI 24.07 kg/m    General: Frail appearing but comfortable and in no acute distress  HEENT: Mild conjunctival pallor, no scleral icterus or injection, moist oral mucosa  Cardiovascular: Normal S1, S2, irregularly irregular rhythm  Respiratory: Lungs clear to auscultation bilaterally  GI: Abdomen soft, non-tender, non-distended, +BS  Extremities: 1+ bilateral LE edema  Neuro: CX II-XII grossly intact; ROM in all four extremities grossly intact  Psych: Alert and oriented x3; normal affect  Skin: No acute rash    LABORATORY/IMAGING DATA:  All relevant labs and imaging data in [facility] and/or Care Everywhere were personally reviewed today.      Most Recent 3 CBC's:Recent Labs   Lab Test 01/25/24  0637 01/22/24  0616 01/21/24  0419   WBC 5.8 5.9 5.9   HGB 11.1* 9.7* 9.8*   MCV 93 90 90    162 147*     Most Recent 3 BMP's:Recent Labs   Lab Test 01/25/24  0637 01/23/24  1205 01/22/24 2122 01/22/24  0845 01/22/24  0616 01/21/24  0742 01/21/24  0418     --   --   --  140  --  136   POTASSIUM 4.6  --   --   --  4.4  --  4.3   CHLORIDE 104  --   --   --  109*  --  104   CO2 28  --   --   --  24  --  26   BUN 22.6  --   --   --  20.1  --  25.6*   CR 0.97  --   --   --  0.91  --  0.96   ANIONGAP 7  --   --   --  7  --  6*   AKILA 9.5  --   --   --  8.2*  --  8.1*   GLC 85 131* 109*   < " > 101*   < > 106*    < > = values in this interval not displayed.     Most Recent 2 LFT's:No lab results found.      ASSESSMENT/PLAN:  Unwitnessed fall, subsequent encounter,  Mild traumatic rhabdomyolysis, subsequent encounter,  Physical deconditioning.  Patient has pain in his legs.  Plan:  Fall precautions  Continue tizanidine 4 mg 3 times daily as needed for muscle spasms  Acetaminophen 650 mg 4 times daily as needed for pain  Continue PT/OT evaluation and therapy    Atrial fibrillation.  New diagnosis.  No prior history of atrial fibrillation.  Deferred anticoagulation per discussion in the hospital, given frequent falls and overall risks outweighing the benefits.   His rhythm sounds irregular on today's examination.  Plan:  Continue aspirin 81 mg daily  Monitor heart rate  Follow-up with cardiology as directed and to be arranged for cardiac monitor/Zio patch    BPH with urinary retention,  Bladder diverticula,  Mild right hydronephrosis,  Microscopic hematuria.  A Pineda catheter was placed in the hospital.  Plan:  TOV today, January 31, at urology office as scheduled  Monitor PVR following catheter removal  Follow-up with urology for cystoscopy on February 26 as scheduled    Acute anemia.  Likely dilutional.  Baseline hemoglobin unknown.  Last CBC on January 25 with hemoglobin 11.1 and MCV 93.  Plan:  Monitor hemoglobin periodically  Monitor for signs or symptoms of blood loss    Hypertension.  Blood pressure was running low normal and PTA losartan-HCTZ was discontinued in the hospital.  SBP around 100.  Plan:  Monitor blood pressure    Dyslipidemia.  Plan:  Continue simvastatin 40 mg daily    DM type II.  Hemoglobin A1c 5.8% on January 20, 2024.  Blood glucose levels in the range of 100-163.  Plan:  Continue metformin 850 mg twice daily  Monitor blood glucose    Hypothyroidism.  Last TSH 2.74 on January 19, 2024.  Plan:  Continue levothyroxine 75 mcg daily    Uncomplicated asthma,  Allergic rhinitis.  Not on  inhaler.  Plan:  Continue Flonase 50 mcg 1 spray into both nostrils daily  Monitor symptoms    Question cognitive impairment.  Suspected in the hospital.  On today's examination, he is oriented x 3.  Plan  Formal cognitive evaluation per OT for safe discharge planning      INCIDENTAL FINDINGS:  Bilateral adrenal nodules (up to 1 cm, per CT on January 19, 2024)  Plan:  Follow-up as outpatient      Orders written by provider at facility:  Acetaminophen 650 mg every 6 hours as needed for pain        Disclaimer: This note contains text created using speech-recognition software and may have unintended word substitutions.    Electronically signed by:  Renetta Martin MD

## 2024-01-31 NOTE — LETTER
1/31/2024        RE: Parviz Yip  [address]  [facility] [id number]        [facility]  INITIAL VISIT NOTE  January 31, 2024    PRIMARY CARE PROVIDER AND CLINIC:  Cam Colunga [address] / [place] [id number]    CHIEF COMPLAINT:  Hospital follow-up/Initial visit    HPI:    Parviz Yip is a 83 year old  (1940) male who [facility] [place] [facility] for an initial visit.     Medical history is notable for hypertension, pericardial effusion, dyslipidemia, DM type II, hypothyroidism, asthma, allergic rhinitis, BPH, and bladder stones.    Summary of hospital course:  Patient was hospitalized at [facility] from January 19 through January 23, 2024 after presenting with generalized weakness and unwitnessed fall and found to have mild rhabdomyolysis, new atrial fibrillation, and urinary retention.   Initial laboratory evaluation was unremarkable for creatinine 0.93, sodium 140, lactic acid 1.7, high-sensitivity troponin I 29-39, TSH 2.74, total , white count 9.5, and hemoglobin 13.  UA was significant for large blood in the urine.  Screening was negative for COVID-19, influenza, and RSV.  EKG showed atrial fibrillation with a heart rate of 63 bpm.  CT head and CT angio head and neck were unremarkable.  X-ray of the chest/ribs showed no rib fracture or pulmonary infiltrate.  CT abdomen/pelvis was significant for mild right-sided hydronephrosis, distended urinary bladder with multiple diverticula, and age-indeterminate compression deformity at the T12 vertebral level.  Brain MRI revealed chronic age-related changes but no acute intracranial abnormality.  Doppler study of lower extremity was negative for DVT. Echocardiogram was remarkable for normal LV systolic function with EF 55 to 60%, indeterminate LV diastolic function, and trace mitral regurgitation.  He was hydrated with IV fluids.  Cardiology was  consulted and after discussion with family, they deferred chronic anticoagulation given the risk of bleeding and fall.  Urology was consulted for urinary retention and a Pineda catheter was placed.  Tamsulosin was not initiated due to low normal blood pressures.  PTA losartan-HCTZ was discontinued. TCU was recommended per therapies.    Patient is admitted to this facility for medical management, nursing care, and rehab.     Of note, history was obtained from patient, facility RN, and extensive review of the chart.    Today's visit:  Patient was seen in his room, sitting in the recliner.  He appears frail but comfortable.  He has a Pineda catheter.  Blood pressure is running soft.  He denies fever, chills, chest pain, palpitation, dyspnea, nausea, vomiting, or abdominal pain.  He reports pain in his penis as well as in his legs.  He had a BM earlier today.      CODE STATUS:   DNR / DNI    PAST MEDICAL HISTORY:   Atrial fibrillation (new diagnosis in January 2024)  Hypertension  Pericardial effusion  Dyslipidemia  DM type II  Hypothyroidism  Uncomplicated asthma  Allergic rhinitis  BPH  Bladder diverticula  Bladder stones  Mild right hydronephrosis  T12 compression fracture  Bilateral adrenal nodules (up to 1 cm, per CT on January 19, 2024)    Past Medical History:   Diagnosis Date     Diabetes (H)      Hypertension      Uncomplicated asthma        PAST SURGICAL HISTORY:   Past Surgical History:   Procedure Laterality Date     SOFT TISSUE SURGERY         FAMILY HISTORY:   Family history is significant for hypertension in his mother and Parkinson's disease in his father.    SOCIAL HISTORY:  Social History     Tobacco Use     Smoking status: Never     Smokeless tobacco: Never   Substance Use Topics     Alcohol use: Never       MEDICATIONS:  Current Outpatient Medications   Medication Sig Dispense Refill     aspirin 81 MG EC tablet Take 81 mg by mouth daily       fluticasone (FLONASE) 50 MCG/ACT nasal spray Spray 1 spray  "into both nostrils daily       levothyroxine (SYNTHROID/LEVOTHROID) 75 MCG tablet Take 75 mcg by mouth daily before breakfast       metFORMIN (GLUCOPHAGE) 850 MG tablet Take 850 mg by mouth 2 times daily (with meals)       simvastatin (ZOCOR) 40 MG tablet Take 40 mg by mouth at bedtime         MED REC REQUIRED  Post Medication Reconciliation Status: medication reconcilation previously completed during another office visit      ALLERGIES:  No Known Allergies    ROS:  10 point ROS were negative other than the symptoms noted above in the HPI.    PHYSICAL EXAM:  Vital signs were reviewed in the chart.  Vital Signs: BP 94/56   Pulse 62   Temp 97.7  F (36.5  C)   Resp 18   Ht 1.803 m (5' 11\")   Wt 78.3 kg (172 lb 9.6 oz)   SpO2 96%   BMI 24.07 kg/m    General: Frail appearing but comfortable and in no acute distress  HEENT: Mild conjunctival pallor, no scleral icterus or injection, moist oral mucosa  Cardiovascular: Normal S1, S2, irregularly irregular rhythm  Respiratory: Lungs clear to auscultation bilaterally  GI: Abdomen soft, non-tender, non-distended, +BS  Extremities: 1+ bilateral LE edema  Neuro: CX II-XII grossly intact; ROM in all four extremities grossly intact  Psych: Alert and oriented x3; normal affect  Skin: No acute rash    LABORATORY/IMAGING DATA:  All relevant labs and imaging data in [facility] and/or Care Everywhere were personally reviewed today.      Most Recent 3 CBC's:Recent Labs   Lab Test 01/25/24  0637 01/22/24  0616 01/21/24  0419   WBC 5.8 5.9 5.9   HGB 11.1* 9.7* 9.8*   MCV 93 90 90    162 147*     Most Recent 3 BMP's:Recent Labs   Lab Test 01/25/24  0637 01/23/24  1205 01/22/24  2122 01/22/24  0845 01/22/24  0616 01/21/24  0742 01/21/24  0418     --   --   --  140  --  136   POTASSIUM 4.6  --   --   --  4.4  --  4.3   CHLORIDE 104  --   --   --  109*  --  104   CO2 28  --   --   --  24  --  26   BUN 22.6  --   --   --  20.1  --  25.6*   CR 0.97  --   --   --  0.91  --  0.96 "   ANIONGAP 7  --   --   --  7  --  6*   AKILA 9.5  --   --   --  8.2*  --  8.1*   GLC 85 131* 109*   < > 101*   < > 106*    < > = values in this interval not displayed.     Most Recent 2 LFT's:No lab results found.      ASSESSMENT/PLAN:  Unwitnessed fall, subsequent encounter,  Mild traumatic rhabdomyolysis, subsequent encounter,  Physical deconditioning.  Patient has pain in his legs.  Plan:  Fall precautions  Continue tizanidine 4 mg 3 times daily as needed for muscle spasms  Acetaminophen 650 mg 4 times daily as needed for pain  Continue PT/OT evaluation and therapy    Atrial fibrillation.  New diagnosis.  No prior history of atrial fibrillation.  Deferred anticoagulation per discussion in the hospital, given frequent falls and overall risks outweighing the benefits.   His rhythm sounds irregular on today's examination.  Plan:  Continue aspirin 81 mg daily  Monitor heart rate  Follow-up with cardiology as directed and to be arranged for cardiac monitor/Zio patch    BPH with urinary retention,  Bladder diverticula,  Mild right hydronephrosis,  Microscopic hematuria.  A Pineda catheter was placed in the hospital.  Plan:  TOV today, January 31, at urology office as scheduled  Monitor PVR following catheter removal  Follow-up with urology for cystoscopy on February 26 as scheduled    Acute anemia.  Likely dilutional.  Baseline hemoglobin unknown.  Last CBC on January 25 with hemoglobin 11.1 and MCV 93.  Plan:  Monitor hemoglobin periodically  Monitor for signs or symptoms of blood loss    Hypertension.  Blood pressure was running low normal and PTA losartan-HCTZ was discontinued in the hospital.  SBP around 100.  Plan:  Monitor blood pressure    Dyslipidemia.  Plan:  Continue simvastatin 40 mg daily    DM type II.  Hemoglobin A1c 5.8% on January 20, 2024.  Blood glucose levels in the range of 100-163.  Plan:  Continue metformin 850 mg twice daily  Monitor blood glucose    Hypothyroidism.  Last TSH 2.74 on January 19,  2024.  Plan:  Continue levothyroxine 75 mcg daily    Uncomplicated asthma,  Allergic rhinitis.  Not on inhaler.  Plan:  Continue Flonase 50 mcg 1 spray into both nostrils daily  Monitor symptoms    Question cognitive impairment.  Suspected in the hospital.  On today's examination, he is oriented x 3.  Plan  Formal cognitive evaluation per OT for safe discharge planning      INCIDENTAL FINDINGS:  Bilateral adrenal nodules (up to 1 cm, per CT on January 19, 2024)  Plan:  Follow-up as outpatient      Orders written by provider at facility:  Acetaminophen 650 mg every 6 hours as needed for pain        Disclaimer: This note contains text created using speech-recognition software and may have unintended word substitutions.    Electronically signed by:  Renetta Martin MD                          Sincerely,        Renetta Martin MD

## 2024-01-31 NOTE — PROGRESS NOTES
Chief Complaint   Patient presents with    Urinary Retention     Patient here after follow in hospital        Parviz Yip comes into clinic today for Urinary Retention  at the request of Dr Cash Ordering Provider for Trial of Void.    Patient here today Per Dr Cash, Follow up, Pineda catheter in place at least one week prior to TOV, - no alpha blocker d/t fall risk,- return for outpatient cystoscopy.      presented today for a trial of void.  Approximately 500 mL of normal saline instilled into bladder via catheter.  Patient stated he had urge to urinate and catheter was removed without difficulty.  Patient was given a Cylinder to measure urine output.  Patient wasn't able  to void   No scan was done.    Fail Trial of Void  Cath had to be replaced  Abx was sent today    Cipro 500 given per protocol: Yes  Using sterile field technique a sterile, well lubricated 16 [place] Pineda [place] tip catheter was gently inserted with ease x 1 attempt.  The balloon was filled with 8cc ml sterile water.  No discomfort was voiced by the patient.  Clear-yellow urine return was noted with 500cc ml drained from the catheter.  Sterile tubing and drainage bag were attached to the catheter and secured to the right upper thigh.  No specimen was ordered to be collected or sent to the lab for examination.  Patient was instructed on proper hygiene and catheter care.  Patient to follow up in 2/26 with Dr Cash for Cystoscopy as planned. Patient was instructed to take one antibiotic to prevent infection which was sent to TCU.     Patient did tolerate procedure well.         This service provided today was under the supervising provider of the sheridan GARCIA, who was available if needed.    SHANNAN Ferreira

## 2024-02-02 NOTE — PROGRESS NOTES
[facility] GERIATRICS DISCHARGE SUMMARY  PATIENT'S NAME: Parviz Yip  YOB: 1940  MEDICAL RECORD NUMBER:  [id number]  Place of Service where encounter took place:  [facility] (TCU) [[id number]]    PRIMARY CARE PROVIDER AND CLINIC RESPONSIBLE AFTER TRANSFER:   Cam Colunga MD, [address] / [place] [address]    G Provider     Transferring providers: Waldo Rosen PA-C, Dr. Veronica MD  Recent Hospitalization/ED:  [facility] stay 1/19/24 to 1/23/24.  Date of SNF Admission:  1/23/24  Date of SNF (anticipated) Discharge:  2/5/24  [facility]  Cognitive Scores:  NA  Physical Function: Ambulating 150 ft with FWW  DME: SNF  coordinating DME needs     CODE STATUS/ADVANCE DIRECTIVES DISCUSSION:  Prior   ALLERGIES: Patient has no known allergies.    NURSING FACILITY COURSE   Medication Changes/Rationale:   Started tizanidine for muscle spasm pain    Summary of nursing facility stay:   Summary: Patient is an 83-year-old male with past medical history of hypertension, diabetes mellitus type 2, asthma, hypothyroidism who was admitted at [facility] from 1/19 - 1/23, 2024 after presenting following a unwitnessed fall and possible syncopal episode. Per notes, patient is very active in the community and when he did not show for a usual breakfast date friends went to check on him and found him on the ground in his apartment. Patient did not recall the events surrounding the fall, did note it happened the day prior. On arrival in the emergency department, acute CVA was considered although patient did not have any focal neurologic deficits. EKG confirmed A-fib with a controlled rate, new diagnosis for patient. CT head as well as CTA of the head and neck were negative for acute stroke. He was discussed with the vascular neurology service who recommended follow-up MRI. Further workup included CT abdomen and  pelvis with evidence of mild hydronephrosis and likely urinary outlet obstruction, PVR indicated returned urine but patient was able to spontaneously void. Additional lab work revealed an elevated CK suggestive of rhabdomyolysis. UA was negative for infection, did indicate hematuria. Troponin was also drawn and mildly elevated. He is admitted, treated with IV fluids with downtrending CK values. MRI of the brain was negative for acute findings, noted motion artifact. In regards to the new onset A-fib, patient was seen by cardiology and risk/benefits of anticoagulation discussed. Ultimately decision was made for conservative approach with aspirin for CVA prophylaxis. Zio patch was placed at hospital discharge. Elevated troponin was suspected to be due to due to demand ischemia. Following admission, patient was noted to have ongoing urinary retention with persistently elevated PVRs prompting insertion of Pineda catheter. Patient was seen by urology and recommended to continue with catheter for 2 weeks then follow-up in clinic with outpatient trial of void. Following therapy evaluations, patient was referred to TCU for ongoing rehab and medical management.      Unwitnessed fall, possible syncope  Generalized weakness  Impaired mobility and ADLs  Physical deconditioning  Mild rhabdomyolysis  As detailed above, patient presented following a fall in his home, events remain unclear however patient was found to have new diagnosis of A-fib.  Found to have mildly elevated CK due to underlying floor overnight treated with IV fluids, denies pain. Repeat CK nml. Continues with muscle cramps. Ca, K nml on BMP 1/25.  -PT/OT continuing  -Continues with tizanidine, tylenol for muscle spasm     A-fib, paroxysmal, new diagnosis  A-fib confirmed on EKG in ED, rate controlled without medications.  TTE with LVEF 55 to 60%, no WMA's.  Seen in consultation with cardiology, deferred anticoagulation following lengthy conversation. HRs  controlled.  -Continues on ASA 81 mg/day  -Zio patch  -Follow-up with cardiology as indicated     Microscopic hematuria  Bladder outlet obstruction  Urinary retention  Noted on CT scan in ED, required straight catheterization with persistently elevated PVRs prompting Pineda catheter insertion and urology consultation. Flomax was not started due to low-normal blood pressures. Failed outpatient TOV on 1/31.  -Continue Pineda catheter with routine cares  -Follow-up with urology as scheduled with cystoscopy on 2/25.     T12 vertebral compression fracture  Age indeterminant, noted on CT imaging.  Nonpainful.     Hypothyroidism  Chronic and stable.  -Continues on levothyroxine     Hypertension  Chronically on Hyzaar, held at discharge due to low-normal blood pressures. BPs remain controlled.  -Monitor trend     Diabetes mellitus type 2  A1c 5.8% on 1/20/2024. BGs controlled.  -Continues on metformin twice daily     Anemia  Hgb 13 on admission, down trended to value 9.7 on 1/22.  Suspected component of dilution from IV fluids given rhabdomyolysis treatment.  Note history of microscopic hematuria, no evidence of gross hematuria. Repeat value 11.1.  -Monitor for bleeding S/S    Discharge Medications:  MED REC REQUIRED  Post Medication Reconciliation Status: patient was not discharged from an inpatient facility or TCU     Current Outpatient Medications   Medication Sig Dispense Refill    acetaminophen (TYLENOL) 325 MG tablet Take 650 mg by mouth every 6 hours as needed for pain      aspirin 81 MG EC tablet Take 81 mg by mouth daily      fluticasone (FLONASE) 50 MCG/ACT nasal spray Spray 1 spray into both nostrils daily      levothyroxine (SYNTHROID/LEVOTHROID) 75 MCG tablet Take 75 mcg by mouth daily before breakfast      metFORMIN (GLUCOPHAGE) 850 MG tablet Take 850 mg by mouth 2 times daily (with meals)      simvastatin (ZOCOR) 40 MG tablet Take 40 mg by mouth at bedtime      tiZANidine (ZANAFLEX) 4 MG tablet Take 4 mg by  "mouth 3 times daily as needed for muscle spasms         Controlled medications:   not applicable/none     Past Medical History:   Past Medical History:   Diagnosis Date    Diabetes (H)     Hypertension     Uncomplicated asthma      Physical Exam:   Vitals: /62   Pulse 61   Temp 97.8  F (36.6  C)   Resp 18   Ht 1.803 m (5' 11\")   Wt 78.7 kg (173 lb 9.6 oz)   SpO2 98%   BMI 24.21 kg/m    BMI: Body mass index is 24.21 kg/m .  GEN: well-developed, well-nourished, appears comfortable  HEENT: NCAT, EOM intact bilaterally, sclera clear, conjunctiva normal, nose & mouth patent, mucous membranes moist  CHEST: lungs CTA bilaterally, no increased work of breathing, no wheeze, crackles, rhonchi  HEART: RRR, S1 & S2, no murmur  ABD: soft, nontender, nondistended, no guarding or rigidity, +BS in all 4 quadrants  : mccrary catheter in place draining clear yellow urine, no gross hematuria, no sediment  MSK: AROM bilateral UE/LE, pedal & radial pulses 2+ bilaterally  NEURO: awake, alert, oriented to name, place, and time. CN II-XII grossly intact. Sensation grossly intact to light touch.   SKIN: warm & dry without rash, no pedal edema    SNF labs: Most Recent 3 CBC's:  Recent Labs   Lab Test 01/25/24  0637 01/22/24  0616 01/21/24  0419   WBC 5.8 5.9 5.9   HGB 11.1* 9.7* 9.8*   MCV 93 90 90    162 147*     Most Recent 3 BMP's:  Recent Labs   Lab Test 01/25/24  0637 01/23/24  1205 01/22/24  2122 01/22/24  0845 01/22/24  0616 01/21/24  0742 01/21/24  0418     --   --   --  140  --  136   POTASSIUM 4.6  --   --   --  4.4  --  4.3   CHLORIDE 104  --   --   --  109*  --  104   CO2 28  --   --   --  24  --  26   BUN 22.6  --   --   --  20.1  --  25.6*   CR 0.97  --   --   --  0.91  --  0.96   ANIONGAP 7  --   --   --  7  --  6*   AKILA 9.5  --   --   --  8.2*  --  8.1*   GLC 85 131* 109*   < > 101*   < > 106*    < > = values in this interval not displayed.       DISCHARGE PLAN:  Follow up labs: No labs " orders/due  Medical Follow Up:      Follow up with primary care provider in 1-2 weeks  Follow up with specialist urology, cardiology as directed   Current [place] scheduled appointments:     Discharge Services: Home Care:  Occupational Therapy, Physical Therapy, Registered Nurse, and Home Health Aide  Discharge Instructions Verbalized to Patient at Discharge:   Pineda catheter: size 16 French; last changed 1/31; change every 4 weeks per urology and as needed.     TOTAL DISCHARGE TIME:   Greater than 30min  Electronically signed by:  Waldo Rosen PA-C     Documentation of Face to Face and Certification for Home Health Services    I certify that services are/were furnished while this patient was under the care of a physician and that a physician or an allowed non-physician practitioner (NPP), had a face-to-face encounter that meets the physician face-to-face encounter requirements. The encounter was in whole, or in part, related to the primary reason for home health. The patient is confined to his/her home and needs intermittent skilled nursing, physical therapy, speech-language pathology, or the continued need for occupational therapy. A plan of care has been established by a physician and is periodically reviewed by a physician.  Date of Face-to-Face Encounter: 2/2/2024.    I certify that, based on my findings, the following services are medically necessary home health services: Nursing, Occupational Therapy, and Physical Therapy.    My clinical findings support the need for the above skilled services because: Requires assistance of another person or specialized equipment to access medical services because patient: Range of motion limitations prevents ability to exit home safely...    Patient to re-establish plan of care with their PCP within 7-10 days after leaving the facility to reestablish care.  Medicare certified NADIYA provider: Waldo Rosen PA-C  Date: February 1, 2024

## 2024-02-07 NOTE — RESULT ENCOUNTER NOTE
Results reviewed, please let Mayor Marlon Yip know that the results were reassuring, rates are well controlled in atrial fibrillation, no significant bradycardia or pauses >3s.     Yakelin the mayor has follow up with you in a few weeks, if stable, we can see him as needed moving forward, if he has more dizziness issues in the future should reassess with repeat monitor, thanks

## 2024-02-07 NOTE — LETTER
"  February 8, 2024       TO: Parviz FORD Theodora   [address]  [place] [address]       [facility], Mr. Yip,    The phone number we have listed for you is disconnected.    Dr. Acosta read your monitor report. He said, \"the results were reassuring, rates are well controlled in atrial fibrillation, no significant bradycardia or pauses >3s.\"    Your next appointment is at the [facility] on 2/28/2024 @ 3:30 PM with YUKI Yakelin Flores.    Thank you  Team 2 R.N.s  [phone]  [facility]          "

## 2024-02-07 NOTE — TELEPHONE ENCOUNTER
Message from Dr. Acosta re: holter Ho, Aaron K, MD [facility] Heart Team 2  Cc: Yakelin Flores, YING CNP  Results reviewed, please let Mayor Marlon Yip know that the results were reassuring, rates are well controlled in atrial fibrillation, no significant bradycardia or pauses >3s.    Yakelin the mayor has follow up with you in a few weeks, if stable, we can see him as needed moving forward, if he has more dizziness issues in the future should reassess with repeat monitor  ======    Patient to see YUKI Yakelin Flores on 2/28/2024     Discharged 1/23/2024:   cardiology spoke with patient also patent's nephew regarding anticoagulation risk of bleeding with his falls/benefit of stroke prevention, refer to cardiology note for detail. Ultimately, decision was made for conservative approached and resuming PTA ASA       11:05 attempted to contact the patient to review holter results confirming afib with average HR 66 BPM. Unable to leave a message as patient's voice mail does not work.

## 2024-02-07 NOTE — TELEPHONE ENCOUNTER
"Patient's phone is now listed as \"disconnected\". Left a message for patient's nephew, Krish, that we are trying to update the patient on his monitor report (stable). Asked for a call back to update our phone contact information.  Left message for patient to call back to Team 2 R.N.s @ [phone]       2/8/2024 1510 letter sent to patient    Marco Antonio, Mr. Yip,    The phone number we have listed for you is disconnected.    Dr. Acosta read your monitor report. He said, \"the results were reassuring, rates are well controlled in atrial fibrillation, no significant bradycardia or pauses >3s.\"    Your next appointment is at the [facility] on 2/28/2024 @ 3:30 PM with YUKI Yakelin Flores.    Thank you  Team 2 R.N.s  [phone]    "

## 2024-02-13 NOTE — TELEPHONE ENCOUNTER
Received an update from patient's local  noting he has moved to [facility] in [place] with cognitive impairment. Concerns to be addressed to his nephew, Krish Jones who as the POA.    POA and Health Directive Records sent to [facility] for scanning.

## 2024-02-26 NOTE — NURSING NOTE
Patient here for Cystoscopy and bladder was filled with 500Cc sterile water. Patient was unable to successfully void during trial of void, and only urinated 20cc. Patient was agreeable to put mccrary catheter in place to avoid going to ER. Patient's Urethra was cleansed with iodine and Lidocaine gel was placed in urethral opening.   Using sterile field technique a sterile, well lubricated 16 Togolese Togolese Mccrary Coude-tip  catheter was gently inserted with ease x 1 attempt.  The balloon was filled with 8cc ml sterile water.  No discomfort was voiced by the patient.  Clear-yellow urine return was noted with 600 ml drained from the catheter.  Sterile tubing and drainage bag were attached to the catheter and secured to the right upper thigh.  No specimen was ordered to be collected or sent to the lab for examination.  Patient was instructed on proper hygiene and catheter care.  Patient to follow up in approximately one month as planned. Patient was instructed to take on antibiotic to prevent infection that will be sent to patient's preferred pharmacy    Prior to Catheter insertion patient's urethra was cleansed with iodine and draped in sterile fashion. Lidocaine Gel was inserted into urethra. Patient tolerated well.   2% Lidocaine Hydrochloride Jelly Lot#[id number]    Exp:  08/25    This service provided today was under the supervising provider of the day  , who was available if needed.    Emery Fuller CMA

## 2024-02-26 NOTE — PATIENT INSTRUCTIONS
"      AFTER YOUR CYSTOSCOPY  ?  ?  You have just completed a cystoscopy, or \"cysto\", which allowed your physician to learn more about your bladder (or to remove a stent placed after surgery). We suggest that you continue to avoid caffeine, fruit juice, and alcohol for the next 24 hours, however, you are encouraged to return to your normal activities.  ?  ?  A few things that are considered normal after your cystoscopy:  ?  * small amount of bleeding (or spotting) that clears within the next 24 hours  ?  * slight burning sensation with urination  ?  * sensation of needing to void (urinate) more frequently  ?  * the feeling of \"air\" in your urine  ?  * mild discomfort that is relieved with Tylenol    * bladder spasms  ?  ?  ?  Please contact our office promptly if you:  ?  * develop a fever above 101 degrees  ?  * are unable to urinate  ?  * develop bright red blood that does not stop  ?  * experience severe pain or swelling  ?  ?  ?  And of course, please contact our office with any concerns or questions [phone].    Today, you have successfully passed a trial of void in office. This means you are going home without the catheter in place. Per MD you are instructed to drink plenty of water. If unable to urinate during office hours Mon-Friday 8am-400pm you may call the office at [phone]. During non-office hours you are instructed to go to ER. You may need to have a catheter replaced.     Thanks!     "

## 2024-02-26 NOTE — LETTER
"2/26/2024       RE: Parviz Yip  [address]  [facility] [place] [facility] [address]     Dear Colleague,    Thank you for referring your patient, Parviz Yip, to the [facility] [place] at [facility]. Please see a copy of my visit note below.    CHIEF COMPLAINT   Parviz Yip who is a 83 year old male returns today for follow-up of BPH with retention.      HPI   Parviz Yip is a 83 year old male returns today for follow-up of BPH with retention    Seen as inpatient consult 1/22/2024; he had been admitted with unwitnessed fall, denies any urinary issues prior to admission but while hospitalized found to have large cath volumes >1L.     Failed TOV 1/31/2024, here for cystoscopy. He has had indwelling Pineda in place since his admission 1/19/2024    He was not started on alpha blocker d/t fall risk. He did not start 5ARI d/t concern for worsening erectile dysfunction.    PHYSICAL EXAM  Patient is a 83 year old  male   Vitals: Blood pressure 127/74, pulse 83, height 1.778 m (5' 10\"), weight 86.2 kg (190 lb), SpO2 97%.  Body mass index is 27.26 kg/m .  General Appearance Adult:   Alert, no acute distress, oriented  HENT: throat/mouth:normal, good dentition  Lungs: no respiratory distress, or pursed lip breathing  Heart: No obvious jugular venous distension present  Abdomen: nondistended  Musculoskeltal: extremities normal, no peripheral edema  Skin: no suspicious lesions or rashes  Neuro: Alert, oriented, speech and mentation normal  Psych: affect and mood normal  Gait: uses a walker  : orthotopic and patent meatus    PRE-PROCEDURE DIAGNOSIS: urinary retention    POST-PROCEDURE DIAGNOSIS: BPH with urinary retention    PROCEDURE: Cystoscopy     DESCRIPTION OF PROCEDURE: After informed consent was obtained, the patient was brought to the procedure room where he was placed in the supine position with all pressure points well padded.  The penis was " prepped and draped in sterile fashion. A flexible cystoscope was introduced through a well-lubricated urethra.     Anterior urethra normal  Prostatic urethra with very large intravesical protrusion of obstructive median lobe; the median lobe has some necrotic tissue and stone embedded in it. The bladder itself has moderate to severe trabeculation with multiple cellules. No bladder tumors but overall cystic inflammatory changes from indwelling Pineda    The flexible cystoscope was removed and the findings were described to the patient.     I had filled his bladder and he was essentially unable to urinate after removal of the scope, only voided ~50 ml total, with bladder scan showing ~715 ml in bladder    I then replaced a Pineda catheter (16 Fr Coude)    Bladder scan 715 ml    ASSESSMENT and PLAN  83 year old male returns today for follow-up of BPH with retention. He has a large obstructive prostate on cystoscopy, with prominent median lobe, and sequelae of chronic obstruction including trabeculation and cellules.    I recommend starting 5ARI to help shrink the prostate, although this may take months to take effect. He is at high fall risk so no alpha blockers. Discussed potential s/e of 5ARI and he wishes to proceed. Unclear if a bladder outlet procedure would benefit him given concern for chronic obstruction and large cath volumes. Will thus get urodynamics test first. If atonic bladder, recommend chronic indwelling urethral Pineda vs. Suprapubic tube placement. Patient is unwilling to cath    Start finasteride 5 mg daily  Bactrim x1 dose today  Return 1 month for trial of void, likely replacement of catheter  Schedule urodynamic  Return after urodynamics      Sebastian Cash MD   [facility]  [facility] Phone: [phone]

## 2024-02-26 NOTE — NURSING NOTE
Chief Complaint   Patient presents with    Urinary Retention     Here in clinic for cystoscopy with DR FREITAS   Prior to the start of the procedure DR FREITAS and with procedural staff participation, I verbally confirmed the patient s identity using two indicators, relevant allergies, that the procedure was appropriate and matched the consent or emergent situation, and that the correct equipment/implants were available. Immediately prior to starting the procedure I conducted the Time Out with the procedural staff and re-confirmed the patient s name, procedure, and site/side. I have wiped the patient off with the povidone-Iodine solution, draped them,  used Lidocaine hydrochloride jelly, and instilled sterile water into the bladder. (The Joint Commission universal protocol was followed.)  Yes    5mL 2% lidocaine hydrochloride Urojet instilled into urethra.    NDC# [address]-4  Lot #: [id number]  Expiration Date:  08-25  Emery Fuller CMA

## 2024-02-26 NOTE — PROGRESS NOTES
"CHIEF COMPLAINT   Parviz Yip who is a 83 year old male returns today for follow-up of BPH with retention.      HPI   Parviz Yip is a 83 year old male returns today for follow-up of BPH with retention    Seen as inpatient consult 1/22/2024; he had been admitted with unwitnessed fall, denies any urinary issues prior to admission but while hospitalized found to have large cath volumes >1L.     Failed TOV 1/31/2024, here for cystoscopy. He has had indwelling Pineda in place since his admission 1/19/2024    He was not started on alpha blocker d/t fall risk. He did not start 5ARI d/t concern for worsening erectile dysfunction.    PHYSICAL EXAM  Patient is a 83 year old  male   Vitals: Blood pressure 127/74, pulse 83, height 1.778 m (5' 10\"), weight 86.2 kg (190 lb), SpO2 97%.  Body mass index is 27.26 kg/m .  General Appearance Adult:   Alert, no acute distress, oriented  HENT: throat/mouth:normal, good dentition  Lungs: no respiratory distress, or pursed lip breathing  Heart: No obvious jugular venous distension present  Abdomen: nondistended  Musculoskeltal: extremities normal, no peripheral edema  Skin: no suspicious lesions or rashes  Neuro: Alert, oriented, speech and mentation normal  Psych: affect and mood normal  Gait: uses a walker  : orthotopic and patent meatus    PRE-PROCEDURE DIAGNOSIS: urinary retention    POST-PROCEDURE DIAGNOSIS: BPH with urinary retention    PROCEDURE: Cystoscopy     DESCRIPTION OF PROCEDURE: After informed consent was obtained, the patient was brought to the procedure room where he was placed in the supine position with all pressure points well padded.  The penis was prepped and draped in sterile fashion. A flexible cystoscope was introduced through a well-lubricated urethra.     Anterior urethra normal  Prostatic urethra with very large intravesical protrusion of obstructive median lobe; the median lobe has some necrotic tissue and stone embedded in it. The bladder itself has " moderate to severe trabeculation with multiple cellules. No bladder tumors but overall cystic inflammatory changes from indwelling Pineda    The flexible cystoscope was removed and the findings were described to the patient.     I had filled his bladder and he was essentially unable to urinate after removal of the scope, only voided ~50 ml total, with bladder scan showing ~715 ml in bladder    I then replaced a Pineda catheter (16 Fr Coude)    Bladder scan 715 ml    ASSESSMENT and PLAN  83 year old male returns today for follow-up of BPH with retention. He has a large obstructive prostate on cystoscopy, with prominent median lobe, and sequelae of chronic obstruction including trabeculation and cellules.    I recommend starting 5ARI to help shrink the prostate, although this may take months to take effect. He is at high fall risk so no alpha blockers. Discussed potential s/e of 5ARI and he wishes to proceed. Unclear if a bladder outlet procedure would benefit him given concern for chronic obstruction and large cath volumes. Will thus get urodynamics test first. If atonic bladder, recommend chronic indwelling urethral Pineda vs. Suprapubic tube placement. Patient is unwilling to cath    Start finasteride 5 mg daily  Bactrim x1 dose today  Return 1 month for trial of void, likely replacement of catheter  Schedule urodynamic  Return after urodynamics        Sebastian Cash MD   [facility]  [facility] Phone: [phone]

## 2024-02-26 NOTE — NURSING NOTE
Chief Complaint   Patient presents with    Urinary Retention     Here in clinic for cystoscopy with DR FREITAS    Pvr after cystoscopy is 714ml done with bladder scan

## 2024-02-28 PROBLEM — E78.5 HYPERLIPIDEMIA LDL GOAL <100: Status: ACTIVE | Noted: 2024-01-01

## 2024-02-28 PROBLEM — I10 BENIGN ESSENTIAL HYPERTENSION: Status: ACTIVE | Noted: 2024-01-01

## 2024-02-28 NOTE — PATIENT INSTRUCTIONS
Thanks for participating in a office visit with the [facility] today.    EKG today shows regular rhythm  Blood pressures well controlled   48 hr holter monitor showed well controlled rates.   Continue aspirin    Follow up as needed     Please call my nurse at  [phone] with any questions or concerns.    Scheduling phone number: [phone]  Reminder: Please bring in all current medications, over the counter supplements and vitamin bottles to your next appointment.

## 2024-02-28 NOTE — LETTER
2/28/2024    Cam Colunga MD  [address]  [facility] [address]    RE: Parviz Livingstonsch       Dear Colleague,     I had the pleasure of seeing Parviz Yip in the [facility].  CARDIOLOGY CLINIC NOTE    PRIMARY CARDIOLOGIST  Dr. Acosta    PRIMARY CARE PHYSICIAN:  Cam Colunga    HISTORY OF PRESENT ILLNESS:  Parviz Yip is a very pleasant 83-year-old male with a past medical history significant for hypertension, diabetes, asthma, hypothyroidism, BPH w/ urinary retension and recurrent falls.    On 1/19/2024 he presented to [facility] after an unwitnessed fall.  He was found to be in atrial fibrillation, mild rhabdomyolysis, mild troponin elevation and mild cognitive impairment.  EKG showed no ischemic changes.  Echocardiogram showed a normal ejection fraction estimated at 55 to 60%, normal RV size and function, and trace mitral regurgitation.  Due to his falls risk, possible cognitive impairment, anemia (Hgb 9.7), and conversation with his power of , anticoagulation was deferred as the risk outweighed the benefit.  He was discharged on aspirin and a 48-hour monitor that showed predominantly atrial fibrillation with an average heart rate of 66, isolated PVCs, and no significant bradycardia or pauses.    He returns to the office today accompanied by his healthcare assistant.  He recently moved into [facility] assisted living [facility].  He walks with a wheeled walker.  He offers no cardiac complaint, denies chest pain, shortness of breath, palpitations, PND, orthopnea, presyncope, syncope, or edema.  Upon exam, lungs are clear bilaterally, heart rate and rhythm regular, no evidence of fluid overload.  He has a indwelling catheter.    EKG today shows sinus rhythm, first-degree AV block    Blood pressure is well-controlled at 131/76  Last BMP showed a sodium of 139, potassium 4.6, BUN 22.6, creatinine 0.97, and GFR 77  Hemoglobin 11.1, platelet 241  Weight 179  pounds    Compliant with all medications      PAST MEDICAL HISTORY:  Past Medical History:   Diagnosis Date    Diabetes (H)     Hypertension     Uncomplicated asthma        MEDICATIONS:  Current Outpatient Medications   Medication    acetaminophen (TYLENOL) 325 MG tablet    aspirin 81 MG EC tablet    finasteride (PROSCAR) 5 MG tablet    fluticasone (FLONASE) 50 MCG/ACT nasal spray    levothyroxine (SYNTHROID/LEVOTHROID) 75 MCG tablet    metFORMIN (GLUCOPHAGE) 850 MG tablet    simvastatin (ZOCOR) 40 MG tablet    tiZANidine (ZANAFLEX) 4 MG tablet     No current facility-administered medications for this visit.       SOCIAL HISTORY:  I have reviewed this patient's social history and updated it with pertinent information if needed. Parviz Yip  reports that he has never smoked. He has never used smokeless tobacco. He reports that he does not drink alcohol and does not use drugs.    PHYSICAL EXAM:  Pulse:  [73] 73  BP: (131)/(76) 131/76  SpO2:  [98 %] 98 %  179 lbs 8 oz    Constitutional: alert, no distress  Respiratory: Good bilateral air entry  Cardiovascular: Regular rate and rhythm  GI: nondistended  Neuropsychiatric: appropriate affact    ASSESSMENT/PLAN:  Pertinent issues addressed/ reviewed during this cardiology visit  Atrial fibrillation -EKG today shows sinus rhythm.  No anticoagulation per family and patient request except aspirin.  Hypertension -well-controlled  Hyperlipidemia -on simvastatin  Type 2 diabetes -on metformin  BPH -on finasteride    Follow-up with PCP and [facility] [place] as needed    It was a pleasure seeing this patient in clinic today. Please do not hesitate to contact me with any future questions.     Yakelin Flores, APRN, CNP  Cardiology - [facility]  02/28/2024    Review of the result(s) of each unique test - Last echocardiogram, EKG, BMP and CBC     The level of medical decision making during this visit was of moderate complexity.    This note was  completed in part using dictation via the Dragon voice recognition software. Some word and grammatical errors may occur and must be interpreted in the appropriate clinical context.  If there are any questions pertaining to this issue, please contact me for further clarification.    Thank you for allowing me to participate in the care of your patient.      Sincerely,     YING Sawyer [facility]  cc:   Flash Acosta MD  [address], [address]  [place]  [place] [address]

## 2024-02-29 NOTE — PROGRESS NOTES
CARDIOLOGY CLINIC NOTE    PRIMARY CARDIOLOGIST  Dr. Acosta    PRIMARY CARE PHYSICIAN:  Cam Colunga    HISTORY OF PRESENT ILLNESS:  Parviz Yip is a very pleasant 83-year-old male with a past medical history significant for hypertension, diabetes, asthma, hypothyroidism, BPH w/ urinary retension and recurrent falls.    On 1/19/2024 he presented to [facility] after an unwitnessed fall.  He was found to be in atrial fibrillation, mild rhabdomyolysis, mild troponin elevation and mild cognitive impairment.  EKG showed no ischemic changes.  Echocardiogram showed a normal ejection fraction estimated at 55 to 60%, normal RV size and function, and trace mitral regurgitation.  Due to his falls risk, possible cognitive impairment, anemia (Hgb 9.7), and conversation with his power of , anticoagulation was deferred as the risk outweighed the benefit.  He was discharged on aspirin and a 48-hour monitor that showed predominantly atrial fibrillation with an average heart rate of 66, isolated PVCs, and no significant bradycardia or pauses.    He returns to the office today accompanied by his healthcare assistant.  He recently moved into [facility] living [facility].  He walks with a wheeled walker.  He offers no cardiac complaint, denies chest pain, shortness of breath, palpitations, PND, orthopnea, presyncope, syncope, or edema.  Upon exam, lungs are clear bilaterally, heart rate and rhythm regular, no evidence of fluid overload.  He has a indwelling catheter.    EKG today shows sinus rhythm, first-degree AV block    Blood pressure is well-controlled at 131/76  Last BMP showed a sodium of 139, potassium 4.6, BUN 22.6, creatinine 0.97, and GFR 77  Hemoglobin 11.1, platelet 241  Weight 179 pounds    Compliant with all medications      PAST MEDICAL HISTORY:  Past Medical History:   Diagnosis Date    Diabetes (H)     Hypertension     Uncomplicated asthma        MEDICATIONS:  Current Outpatient  Medications   Medication    acetaminophen (TYLENOL) 325 MG tablet    aspirin 81 MG EC tablet    finasteride (PROSCAR) 5 MG tablet    fluticasone (FLONASE) 50 MCG/ACT nasal spray    levothyroxine (SYNTHROID/LEVOTHROID) 75 MCG tablet    metFORMIN (GLUCOPHAGE) 850 MG tablet    simvastatin (ZOCOR) 40 MG tablet    tiZANidine (ZANAFLEX) 4 MG tablet     No current facility-administered medications for this visit.       SOCIAL HISTORY:  I have reviewed this patient's social history and updated it with pertinent information if needed. Parviz Yip  reports that he has never smoked. He has never used smokeless tobacco. He reports that he does not drink alcohol and does not use drugs.    PHYSICAL EXAM:  Pulse:  [73] 73  BP: (131)/(76) 131/76  SpO2:  [98 %] 98 %  179 lbs 8 oz    Constitutional: alert, no distress  Respiratory: Good bilateral air entry  Cardiovascular: Regular rate and rhythm  GI: nondistended  Neuropsychiatric: appropriate affact    ASSESSMENT/PLAN:  Pertinent issues addressed/ reviewed during this cardiology visit  Atrial fibrillation -EKG today shows sinus rhythm.  No anticoagulation per family and patient request except aspirin.  Hypertension -well-controlled  Hyperlipidemia -on simvastatin  Type 2 diabetes -on metformin  BPH -on finasteride    Follow-up with PCP and [facility] [place] as needed    It was a pleasure seeing this patient in clinic today. Please do not hesitate to contact me with any future questions.     YING Sawyer, CNP  Cardiology - [facility]  02/28/2024    Review of the result(s) of each unique test - Last echocardiogram, EKG, BMP and CBC     The level of medical decision making during this visit was of moderate complexity.    This note was completed in part using dictation via the Dragon voice recognition software. Some word and grammatical errors may occur and must be interpreted in the appropriate clinical context.  If there are any  questions pertaining to this issue, please contact me for further clarification.

## 2024-03-16 NOTE — ED NOTES
Per Dr. Michael, RN to change out mccrray catheter. 16 Bengali coude catheter inserted, with clear urine return. Pt tolerated insertion.

## 2024-03-16 NOTE — ED TRIAGE NOTES
Pt reports having pain in his penis from the catheter. Pt states that they found blood in his urine along with a concern for infection.     Triage Assessment (Adult)       Row Name 03/16/24 3672          Triage Assessment    Airway WDL WDL        Respiratory WDL    Respiratory WDL WDL        Skin Circulation/Temperature WDL    Skin Circulation/Temperature WDL WDL        Cardiac WDL    Cardiac WDL WDL        Cognitive/Neuro/Behavioral WDL    Cognitive/Neuro/Behavioral WDL WDL

## 2024-03-16 NOTE — ED PROVIDER NOTES
History     Chief Complaint:  Dysuria       HPI   Parviz Yip is a 83 year old male with BPH and obstructive uropathy and chronic indwelling catheter who presents with concerns over discomfort at the tip of his penis as well as some sediment in the catheter and foul smelling odor to the urine.  He denies any nausea, vomiting, diarrhea.  There is been a small amount of blood in the urine as well.  He does take an aspirin but no other thinners. No penile discharge.  Not currently on any antibiotics.  It sounds as though last Pineda catheter change may have been over a month ago.      Independent Historian:    The patient    Review of External Notes:  None    Medications:    cephALEXin (KEFLEX) 500 MG capsule  acetaminophen (TYLENOL) 325 MG tablet  aspirin 81 MG EC tablet  finasteride (PROSCAR) 5 MG tablet  fluticasone (FLONASE) 50 MCG/ACT nasal spray  levothyroxine (SYNTHROID/LEVOTHROID) 75 MCG tablet  metFORMIN (GLUCOPHAGE) 850 MG tablet  simvastatin (ZOCOR) 40 MG tablet  tiZANidine (ZANAFLEX) 4 MG tablet        Past Medical History:    Past Medical History:   Diagnosis Date    Diabetes (H)     Hypertension     Uncomplicated asthma        Past Surgical History:    Past Surgical History:   Procedure Laterality Date    SOFT TISSUE SURGERY            Physical Exam   Patient Vitals for the past 24 hrs:   BP Temp Temp src Pulse Resp SpO2 Weight   03/16/24 1442 (!) 144/72 97.2  F (36.2  C) Temporal 79 16 97 % 81.6 kg (180 lb)        Physical Exam  Constitutional: Vital signs reviewed as above  General: Alert, pleasant  HEENT: Moist mucous membranes  Eyes: Pupils are equal, round, and reactive to light.   Neck: Normal range of motion  Cardiovascular: normal rate, Regular rhythm and normal heart sounds.  No MRG  Pulmonary/Chest: Effort normal and breath sounds normal. No respiratory distress. Patient has no wheezes. Patient has no rales.   Gastrointestinal: Soft. Positive bowel sounds.  No tenderness over the  suprapubic region.  No flank tenderness.  : Pineda catheter in place.  There is some very mild skin irritation and erythema to the distal glans penis.  No bleeding or drainage.  Pineda catheter appears to be draining well  Musculoskeletal/Extremities: Full ROM.  Endo: No pitting edema  Neurological: Alert, no focal deficits.  Skin: Skin is warm and dry.   Psychiatric: Pleasant      Emergency Department Course           Laboratory:  Labs Ordered and Resulted from Time of ED Arrival to Time of ED Departure   ROUTINE UA WITH MICROSCOPIC REFLEX TO CULTURE - Abnormal       Result Value    Color Urine Brown (*)     Appearance Urine Cloudy (*)     Glucose Urine Negative      Bilirubin Urine Negative      Ketones Urine Negative      Specific Gravity Urine 1.019      Blood Urine Large (*)     pH Urine 6.0      Protein Albumin Urine 70 (*)     Urobilinogen Urine 2.0      Nitrite Urine Negative      Leukocyte Esterase Urine Large (*)     Bacteria Urine Few (*)     WBC Clumps Urine Present (*)     Mucus Urine Present (*)     RBC Urine >182 (*)     WBC Urine >182 (*)     Squamous Epithelials Urine 1     COMPREHENSIVE METABOLIC PANEL - Abnormal    Sodium 140      Potassium 4.3      Carbon Dioxide (CO2) 27      Anion Gap 10      Urea Nitrogen 25.2 (*)     Creatinine 1.14      GFR Estimate 64      Calcium 8.9      Chloride 103      Glucose 126 (*)     Alkaline Phosphatase 93      AST 19      ALT 16      Protein Total 7.1      Albumin 4.1      Bilirubin Total 0.2     CBC WITH PLATELETS AND DIFFERENTIAL - Abnormal    WBC Count 6.7      RBC Count 3.92 (*)     Hemoglobin 11.5 (*)     Hematocrit 36.0 (*)     MCV 92      MCH 29.3      MCHC 31.9      RDW 13.9      Platelet Count 278      % Neutrophils 64      % Lymphocytes 15      % Monocytes 12      % Eosinophils 6      % Basophils 2      % Immature Granulocytes 1      NRBCs per 100 WBC 0      Absolute Neutrophils 4.4      Absolute Lymphocytes 1.0      Absolute Monocytes 0.8       Absolute Eosinophils 0.4      Absolute Basophils 0.1      Absolute Immature Granulocytes 0.0      Absolute NRBCs 0.0     URINE CULTURE        Procedures   Pineda catheter was swapped out for a new 1 via nursing staff.    Emergency Department Course & Assessments:    Interventions:  Medications   cefTRIAXone (ROCEPHIN) 1 g vial to attach to  mL bag for ADULTS or NS 50 mL bag for PEDS (1 g Intravenous $New Bag 3/16/24 2041)        Independent Interpretation (X-rays, CTs, rhythm strip):  None    Consultations/Discussion of Management or Tests:  None       Social Determinants of Health affecting care:  None     Disposition:  The patient was discharged.    Impression & Plan        Medical Decision Making:  Patient presents to the emergency department with concerns over foul-smelling urine and pain at the tip of his penis with an indwelling Pineda catheter.  This catheter is over 1 month old.  There has been some sediment in there as well.  Urine does indeed show infection.  I do not see any recent urine cultures so I will start him on Rocephin.  Kidney function is preserved here and white count is normal.  He is not in any pain and there is no nausea or vomiting.  We will swap out the catheter for a fresh one given the issue has been having and the fact that it is over 30 days.  He will be discharged back to his facility on Keflex.  Follow-up as needed.      Diagnosis:    ICD-10-CM    1. Urinary tract infection associated with indwelling urethral catheter, initial encounter  (H24)  T83.511A     N39.0       2. Urinary catheter (Pineda) change required  Z46.6            Discharge Medications:  New Prescriptions    CEPHALEXIN (KEFLEX) 500 MG CAPSULE    Take 1 capsule (500 mg) by mouth 2 times daily for 7 days          Tony Michael MD  3/16/2024   Tony Michael MD Walters, Brent Aaron, MD  03/16/24 1561

## 2024-03-17 NOTE — ED NOTES
EMS came-ready for transfer back to his facility-previous nurse made the [facility] aware  of the discharge.  Noted that this patient has episode of memory lapse-confirmed with the EDMD.

## 2024-03-19 NOTE — PROGRESS NOTES
Clinic Care Coordination Contact  [facility]/Voicemail    Clinical Data: Care Coordinator Outreach: Pt was evaluated in the ED on 3/16 for urinary retention.     Outreach Documentation Number of Outreach Attempt   3/19/2024  10:22 AM 1       Attempted to call pt, however phone numbers on file for patient do not work.    Plan: Care Coordinator will do no further outreaches at this time.    Lucía Carlos [facility]  Social Work Care Coordinator  Phone: [phone]

## 2024-03-20 NOTE — TELEPHONE ENCOUNTER
Returned phone call to Ana and discussed future appointments and upcoming catheter changes ect. They will have [facility] physicians contact us regarding future cath orders.     Yvonne Caal LPN

## 2024-03-20 NOTE — TELEPHONE ENCOUNTER
[facility]    Phone Message    May a detailed message be left on voicemail: yes     Reason for Call: Other: Ana called in regarding patient, she is the health care agent and [place]. Would like to speak with a nurse regarding some questions she has relating to patient having a catheter, future follow up, along with other health questions she has regarding Parviz. Please reach out to her as patient is in another facility for care and does not have a phone. [phone] is the best number to be reached at.     Action Taken: Other: Urology    Travel Screening: Not Applicable

## 2024-03-29 NOTE — TELEPHONE ENCOUNTER
[facility] Call [facility]    Phone Message    May a detailed message be left on voicemail: yes     Reason for Call: Other: Home [facility] care is wondering about the catheter for the pt - if it is going to be long term or going to be removed? What the plan is for who should be changing the catheter -- them or if in the clinic. Please call to verify so they know it's being managed before they discharge it.      Action Taken: Message routed to:  [facility]    Travel Screening: Not Applicable

## 2024-04-08 NOTE — PROGRESS NOTES
Parviz Yip comes into clinic today at the request of Dr. Cash Ordering Provider for Trial of Void./ Possible Catheter replacement.    The patient is here for a Pineda catheter change due to Urinary Retention.  Patient's catheter was disconnected from the leg bag and a sterile cysto tubing with water bag was attached to the catheter with 250cc of sterile H2O instilled into the bladder via gravity with ease. The catheter was then removed with no complaints offered by the patient and the procedure was tolerated well.  Patient was unable to successfully void on his own and was agreeable to have catheter replaced.    Using sterile field technique a sterile, well lubricated 18 [place]an [place]ankan Pineda Coude-tip  catheter was gently inserted with ease x 1 attempt.  The balloon was filled with 8cc ml sterile water.  No discomfort was voiced by the patient.  Clear-yellow urine return was noted with 250 ml drained from the catheter.  Sterile tubing and drainage bag were attached to the catheter and secured to the right upper thigh.  No specimen was ordered to be collected or sent to the lab for examination.  Patient was instructed on proper hygiene and catheter care.  Patient to follow up in approximately one month as planned. Patient was instructed to take on antibiotic to prevent infection that they have on hand.     Prior to Catheter insertion patient's urethra was cleansed with iodine and draped in sterile fashion.     This service provided today was under the supervising provider of the day Lakisha HE CNP , who was available if needed.    Yvonne Caal LPN

## 2024-04-21 NOTE — ED TRIAGE NOTES
Blood in urine , has floey for retention for a couple months, today blood started , doesn't think he is retaining doesn't have pain in bladder area just at end of penis

## 2024-04-21 NOTE — ED PROVIDER NOTES
History     Chief Complaint:  Hematuria       HPI   Parviz Yip is a 84 year old male presenting to the emergency department for evaluation of hematuria. The patient presents with his PCA and is coming from his assisted living facility, [facility], after nurses at the facility noticed blood and blood clots in the patient's leg bag connected to his mccrary catheter. The patient's PCA was with him two days ago and denies seeing any blood in the patient's urine. The last time the patient's leg bag and catheter was changed was two weeks ago. The patient's PCA notes the patient was in the emergency department approximately one and a half months ago due to hematuria; it was found then that the patient had a UTI.         Independent Historian:   The patient and his PCA, Laure.    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    Constitutional:  Appears comfortable.    HENT:    Nose normal.  No discharge.      Oral mucosa is moist.    GI:    Soft. No suprapubic tenderness.      No flank pain. Patient has a mccrary catheter in place with dark blood.   :                             Catheter in place with no blood leaking from area of catheter.  Neurological:   Alert and oriented. No focal weakness.  Skin:    Skin is warm and dry.  There was some calluses over the inner upper butt cheeks bilaterally.  No skin breakdown.  Psychiatric:    Behavior is normal. Appropriate mood and affect.     Judgment and thought content normal.       Emergency Department Course       Laboratory:  Labs Ordered and Resulted from Time of ED Arrival to Time of ED Departure   ROUTINE UA WITH MICROSCOPIC - Abnormal       Result Value    Color Urine Red (*)     Appearance Urine Bloody (*)     Glucose Urine        Bilirubin Urine        Ketones Urine        Specific Gravity Urine        Blood Urine Large (*)     pH Urine        Protein Albumin Urine        Urobilinogen Urine        Nitrite Urine        Leukocyte Esterase Urine        RBC Urine >182 (*)     WBC Urine 10 (*)    HEMOGLOBIN - Abnormal    Hemoglobin 11.9 (*)    URINE CULTURE            Emergency Department Course & Assessments:    Interventions:  Medications   lidocaine (XYLOCAINE) 2 % external gel 10 mL (6 mLs Urethral $Given 4/21/24 1611)        Assessments:  1554     I obtained history and examined the patient as noted above.   1725     I reevaluated the patient.     Independent Interpretation (X-rays, CTs, rhythm strip):  None    Consultations/Discussion of Management or Tests:  None        Social Determinants of Health affecting care:   None    Disposition:  The patient was discharged.     Impression & Plan    CMS Diagnoses: None         Medical Decision Making:  Patient comes in for evaluation of blood in his catheter.  It is draining fine.  The catheter was changed as it has been 2 weeks.  Urine was sent off and did not look infected although is very bloody.  I sent off a culture.  He is having no discomfort.  Hemoglobin is stable.  They will be contacted if the urine comes back positive for an infection.  Otherwise routine Pineda catheter care.  He is not on blood thinners.  It could be due to some irritation as apparently stumbled the other day may be pulled on it a little bit and irritated the bladder this should stop over the next couple of days.  Recommended that they irrigate if plugs or come back to  the ER if they can get it irrigated otherwise keep follow-up appointment in 2 weeks with urology.    Continue Pineda catheter cares.  There will be blood in the catheter for a few days.  If it seems to plug up probably with a clot, attempt to irrigate it with sterile water or sterile saline.  If unable to and it continues to be obstructed and not draining, then call the clinic if it is during clinic hours or come to the ER for recheck.  If the culture comes back positive, you will be contacted and started on an antibiotic.    Diagnosis:    ICD-10-CM    1. Gross hematuria  R31.0       2. Chronic indwelling Pineda catheter  Z97.8            Discharge Medications:  New Prescriptions    No medications on file          Scribe Disclosure:  I, Dakota Israel, am serving as a scribe at 4:08 PM on 4/21/2024 to document services personally performed by Arianna Cavanaugh MD based on my observations and the provider's statements to me.   4/21/2024   Arianna Cavanaugh MD Powell, Tracy Alan, MD  04/21/24 180

## 2024-04-21 NOTE — DISCHARGE INSTRUCTIONS
Continue Pineda catheter cares.  There will be blood in the catheter for a few days.  If it seems to plug up probably with a clot, attempt to irrigate it with sterile water or sterile saline.  If unable to and it continues to be obstructed and not draining, then call the clinic if it is during clinic hours or come to the ER for recheck.  If the culture comes back positive, you will be contacted and started on an antibiotic.

## 2024-04-25 NOTE — TELEPHONE ENCOUNTER
[facility]    Reason for call: Lab Result Notification     Lab Result (including Rx patient on, if applicable).  If culture, copy of lab report at bottom.  Lab Result: See below    No antibiotics prescribed    Creatinine Level (mg/dl)   Creatinine   Date Value Ref Range Status   03/16/2024 1.14 0.67 - 1.17 mg/dL Final   05/26/2009 0.89 0.66 - 1.25 mg/dL Final     Comment:     New IDMS-traceable calibration  beginning 5/1/08    Creatinine clearance (ml/min), if applicable    Serum creatinine: 1.14 mg/dL 03/16/24 1616  Estimated creatinine clearance: 55.7 mL/min     Patient's current Symptoms:    Unable to assess. Left a message with Anuradha at the [facility] facility to call me back. Will also fax the results to [facility] physicians.     RN Recommendations/Instructions per [place] ED lab result protocol:   [facility] ED lab result protocol utilized: Urine culture    TIFFANIE JEAN RN

## 2024-07-17 NOTE — PROGRESS NOTES
PREPROCEDURE DIAGNOSES:    1. Urinary retention    POSTPROCEDURE DIAGNOSES:  Urinary retention   DO/DOI    PROCEDURE:    -Sterile urethral catheterization for measurement of postvoid residual urine volume.  -Complex filling cystometrogram with measurement of bladder and rectal pressures.  -Electromyography of the pelvic floor during urodynamics.    INDICATIONS FOR PROCEDURE:  Mr. Parviz Yip is a pleasant 84 year old male with urinary retention. Urodynamic assessment is requested today by Dr. Cash to better characterize Mr. Parviz Yip's voiding dysfunction.      VOIDING DIARY:  Not completed as has chronic mccrary catheter placed.     DESCRIPTION OF PROCEDURE:  Risks, benefits, and alternatives to urodynamics were discussed with the patient and he wished to proceed. Urodynamics are planned to better assess the primary etiology for Mr. Yip's urologic dysfunction.   After informed consent was obtained, the patient was taken to the procedure room where the study was initiated. Findings below.     PRE-STUDY UROFLOWMETRY:  Pre-study uroflow not obtained due to chronic mccrary catheter currently in place. UA not obtained as likely contaminated secondary to chronic mccrary catheter. Patient denies s/s of UTI today.     Next a 7F double-lumen urodynamics catheter was inserted into the bladder under sterile technique via the urethra.  A 7F abdominal manometry catheter was placed in the rectum.  EMG pads were placed on both sides of the anal verge.  The bladder was filled with sterile normal saline at 10-30 mL/minute and serial pressures were recorded.  With coughing there was an appropriate rise in vesical and abdominal pressures with no change in detrusor pressure, confirming good study catheter placement.    DURING THE FILLING PHASE:  First sensation: 45 mL.  First Desire: 57 mL.  Strong Desire: 152 mL.  Maximum Capacity: snf 500 mL.    Uninhibited detrusor contractions: had DO throughout study. At beginning of  study had DO with DOI.  Compliance: unable to adequately assess compliance given DO throughout study and malfunction with Pabd and Pves catheters.   Continence: No VANE. Had DO with DOI at Pdet 17 cmH2O at a volume of 31 mL, and also had DOI at Pdet 9 cmH2O at a volume of 71mL.   EMG: quiet during filling.    DURING THE VOIDING PHASE:  Unable to evaluate voiding phase as patient was unable to void despite valsalva efforts. Catheters were removed and still unable to void on his own. Pineda catheter then placed with 500mL drained into catheter bag.       FLUOROSCOPIC IMAGING OF THE BLADDER WAS NOT OBTAINED DURING URODYNAMICS GIVEN OUR LOCATION DOES NOT PROVIDE FLUOROSCOPY.    ASSESSMENT/PLAN:  Mr. Parviz Yip is a pleasant 84 year old male with urinary retention who demonstrated the following findings today on urodynamic evaluation:    - Good bladder capacity (correction 500 mL) with heightened filling sensations at for first sensation, first desire and strong desire.  -unable to adequately assess compliance given DO throughout study and malfunction with Pabd and Pves catheters. Patient also had DOI at Pdet 17 cmH2O at a volume of 31 mL, and also had DOI at Pdet 9 cmH2O at a volume of 71mL.   -No VANE.  - Unable to evaluate voiding phase as patient was unable to void despite valsalva efforts. Catheters were removed and still unable to void on his own. Pineda catheter then placed with 500mL drained into catheter bag.     The patient will follow up as scheduled with Dr. Cash to further discuss today's study results and make plans for how best to proceed.      - A single cipro was provided for UTI prophylaxis following completion of today's study per department protocol.  The risk of UTI with UDS is low at ~2.5-3%.      Thank you for allowing me to participate in the care of Mr. Parviz Yip and please don't hesitate to contact me with any questions or concerns.      Marla Plasencia PA-C  Department of Urology

## 2024-07-17 NOTE — NURSING NOTE
5mL 2% lidocaine hydrochloride Urojet instilled into urethra.    NDC# [address]-3  Lot #: [id number]  Expiration Date: 6-25  Catheter insertion documentation on 7/17/2024:    Parviz Yip presents to the clinic for catheter insertion.  Reason for insertion: replacement  Order has been verified. yes  Catheter successfully inserted into the urethral meatus in the usual sterile fashion without immediate complication.  Type of catheter placed: 16 [place] Coude catheter  Urine is clear in color.  500 cc's of urine output returned.  Balloon was filled with 8 cc's of normal saline.  Securement device placed for the catheter.  The patient tolerated the procedure and was instructed to return 4 weeks    Flavia Cambpell LPN

## 2024-07-17 NOTE — PATIENT INSTRUCTIONS
Please go to [facility] and  your 1x dose of bactrim after your urodynamic study today and take today. Contact clinic if you develop symptoms of a UTI. Keep follow-up appointment with Dr. Cash as planned.

## 2024-07-20 NOTE — ED PROVIDER NOTES
Emergency Department Note      History of Present Illness     Chief Complaint   Fall      HPI   Parviz Yip is a 84 year old male presenting from his memory care unit with multiple falls.  Per paramedic and family's report, the patient seems to fall very frequently.  The facility is having a hard time keeping an eye on him.  His power recliner is no longer working.  He used to sit and sleep in this.  He is now having to get up from the chair and to lie down in a normal bed for rest.  In the middle of the night, he has been having falls when he tries to get from 1 piece of furniture to the other.  He denies injuring himself during any of these falls.  However, with an increase in number of falls recently, family wanted to have him seen.  They do note that he is having a progressive decline secondary to his dementia.  Otherwise, they deny any new medication changes or desires for aggressive therapies.    History severely limited secondary to patient's dementia.    Independent Historian   Niece (POA) as detailed above.    Review of External Notes   Yes-I reviewed the patient's visit to his urologist 2 days ago for Pineda catheter change and evaluation for urinary tract infection.    Past Medical History     Medical History and Problem List   Past Medical History:   Diagnosis Date    Diabetes (H)     Hypertension     Uncomplicated asthma        Medications   acetaminophen (TYLENOL) 325 MG tablet  aspirin 81 MG EC tablet  finasteride (PROSCAR) 5 MG tablet  fluticasone (FLONASE) 50 MCG/ACT nasal spray  levothyroxine (SYNTHROID/LEVOTHROID) 75 MCG tablet  metFORMIN (GLUCOPHAGE) 850 MG tablet  simvastatin (ZOCOR) 40 MG tablet  tiZANidine (ZANAFLEX) 4 MG tablet        Surgical History   Past Surgical History:   Procedure Laterality Date    SOFT TISSUE SURGERY         Physical Exam     Patient Vitals for the past 24 hrs:   BP Pulse Resp   07/20/24 1800 127/88 78 16     Physical Exam  Eye:  Pupils are equal, round, and  reactive.  Extraocular movements intact.    ENT:  No rhinorrhea.  Moist mucus membranes.  Normal tongue and tonsil.    Cardiac:  Regular rate and rhythm.  No murmurs, gallops, or rubs.    Pulmonary:  Clear to auscultation bilaterally.  No wheezes, rales, or rhonchi.    Abdomen:  Positive bowel sounds.  Abdomen is soft and non-distended, without focal tenderness.    Musculoskeletal:  Normal movement of all extremities without evidence for deficit.  No midline tenderness of the neck or back.  No evidence of trauma to the skull.    Skin:  Warm and dry without rashes.    Neurologic:  Non-focal exam without asymmetric weakness or numbness.     Psychiatric: Pleasantly demented, though poor historian.      Diagnostics     Lab Results   Labs Ordered and Resulted from Time of ED Arrival to Time of ED Departure   COMPREHENSIVE METABOLIC PANEL - Abnormal       Result Value    Sodium 138      Potassium 4.9      Carbon Dioxide (CO2) 27      Anion Gap 11      Urea Nitrogen 24.1 (*)     Creatinine 1.23 (*)     GFR Estimate 58 (*)     Calcium 9.3      Chloride 100      Glucose 125 (*)     Alkaline Phosphatase 65      AST 33      ALT 13      Protein Total 7.3      Albumin 4.1      Bilirubin Total 0.6     LACTIC ACID WHOLE BLOOD - Abnormal    Lactic Acid 2.4 (*)    CBC WITH PLATELETS AND DIFFERENTIAL - Abnormal    WBC Count 8.6      RBC Count 4.16 (*)     Hemoglobin 12.4 (*)     Hematocrit 37.8 (*)     MCV 91      MCH 29.8      MCHC 32.8      RDW 15.5 (*)     Platelet Count 198      % Neutrophils 79      % Lymphocytes 7      % Monocytes 9      % Eosinophils 5      % Basophils 1      % Immature Granulocytes 0      NRBCs per 100 WBC 0      Absolute Neutrophils 6.8      Absolute Lymphocytes 0.6 (*)     Absolute Monocytes 0.8      Absolute Eosinophils 0.5      Absolute Basophils 0.1      Absolute Immature Granulocytes 0.0      Absolute NRBCs 0.0     TROPONIN T, HIGH SENSITIVITY - Abnormal    Troponin T, High Sensitivity 36 (*)         Imaging   Head CT w/o contrast   Final Result   IMPRESSION:   1.  No CT evidence for acute intracranial process.   2.  Brain atrophy and presumed chronic microvascular ischemic changes as above.          Independent Interpretation   None    ED Course      Medications Administered   Medications - No data to display    Procedures   Procedures     Discussion of Management   None    ED Course        Optional/Additional Documentation  None    Medical Decision Making / Diagnosis     CMS Diagnoses: None    MIPS       None    [facility]   Parviz Yip is a 84 year old male presenting to us because of frequent falls.  He is in a memory care unit and has been having progressive weakness and cognitive decline over the past 9 months.  I spoke with the patient's niece who is the power of  and specifically requested his transfer to us.  She has no other specific complaints, merely wanting to make sure that we were not missing a reversible medical causes for his falls.  Otherwise, he appears well by physical exam.  Head CT is negative.  Lab work is unremarkable.  I see no indication to send the urine as he has recently had this investigated by his urologist and has a chronic indwelling Pineda.  At this juncture, I feel he is safe to return to his facility.  I spoke with the family at length about the likely source of his decline being secondary to his dementia.  We strategized ways that they could help reduce his risks of falling, specifically getting a bed alarm and also considering getting a new power chair for him.  Otherwise, they understand we are always happy to reassess him if he is having any worsening of his condition or other emergent concerns.    Disposition   The patient was discharged.     Diagnosis     ICD-10-CM    1. Falls frequently  R29.6       2. History of dementia  Z86.59            Discharge Medications   Discharge Medication List as of 7/20/2024  5:35 PM            Chad A. Trierweiler, MD        Trierweiler, Chad A, MD  07/21/24 5575

## 2024-07-20 NOTE — ED TRIAGE NOTES
pt comes in from a memory care unit via ems   Pt has hx of demenita     Pt is prone to falls   Pt has had a fall every day for the last 3 days   All falls are unwitnessed   Pt has no pain from falls   Pt POA wants him evaluated from the falls in the ED     Pt has increased weakness   Pt currently has a broken lift chair at resident       [place] [id number]

## 2024-07-20 NOTE — ED NOTES
Bed: ED06  Expected date:   Expected time:   Means of arrival:   Comments:  449: 84M, bartolo santos, from memory care